# Patient Record
Sex: FEMALE | Race: WHITE | Employment: FULL TIME | URBAN - NONMETROPOLITAN AREA
[De-identification: names, ages, dates, MRNs, and addresses within clinical notes are randomized per-mention and may not be internally consistent; named-entity substitution may affect disease eponyms.]

---

## 2017-04-21 ENCOUNTER — OFFICE VISIT (OUTPATIENT)
Dept: GASTROENTEROLOGY | Age: 52
End: 2017-04-21
Payer: COMMERCIAL

## 2017-04-21 VITALS
HEART RATE: 54 BPM | OXYGEN SATURATION: 92 % | HEIGHT: 67 IN | BODY MASS INDEX: 33.9 KG/M2 | WEIGHT: 216 LBS | SYSTOLIC BLOOD PRESSURE: 136 MMHG | DIASTOLIC BLOOD PRESSURE: 82 MMHG

## 2017-04-21 DIAGNOSIS — Z12.11 ENCOUNTER FOR SCREENING COLONOSCOPY: Primary | ICD-10-CM

## 2017-04-21 PROCEDURE — 99204 OFFICE O/P NEW MOD 45 MIN: CPT | Performed by: NURSE PRACTITIONER

## 2017-04-21 RX ORDER — CHOLECALCIFEROL (VITAMIN D3) 1250 MCG
CAPSULE ORAL
COMMUNITY

## 2017-04-21 ASSESSMENT — ENCOUNTER SYMPTOMS
SHORTNESS OF BREATH: 0
DIARRHEA: 0
ABDOMINAL PAIN: 0
VOICE CHANGE: 0
NAUSEA: 0
COUGH: 0
BACK PAIN: 0
SORE THROAT: 0
VOMITING: 0
CHEST TIGHTNESS: 0
RECTAL PAIN: 0
CONSTIPATION: 0
ABDOMINAL DISTENTION: 0
BLOOD IN STOOL: 0

## 2017-05-25 ENCOUNTER — ANESTHESIA EVENT (OUTPATIENT)
Dept: ENDOSCOPY | Age: 52
End: 2017-05-25
Payer: COMMERCIAL

## 2017-05-25 ENCOUNTER — HOSPITAL ENCOUNTER (OUTPATIENT)
Age: 52
Setting detail: OUTPATIENT SURGERY
Discharge: HOME | End: 2017-05-25
Attending: INTERNAL MEDICINE | Admitting: INTERNAL MEDICINE
Payer: COMMERCIAL

## 2017-05-25 ENCOUNTER — ANESTHESIA (OUTPATIENT)
Dept: ENDOSCOPY | Age: 52
End: 2017-05-25
Payer: COMMERCIAL

## 2017-05-25 VITALS
HEIGHT: 67 IN | TEMPERATURE: 98 F | HEART RATE: 59 BPM | OXYGEN SATURATION: 100 % | SYSTOLIC BLOOD PRESSURE: 125 MMHG | WEIGHT: 208.4 LBS | BODY MASS INDEX: 32.71 KG/M2 | DIASTOLIC BLOOD PRESSURE: 90 MMHG | RESPIRATION RATE: 18 BRPM

## 2017-05-25 VITALS
RESPIRATION RATE: 14 BRPM | DIASTOLIC BLOOD PRESSURE: 59 MMHG | SYSTOLIC BLOOD PRESSURE: 114 MMHG | OXYGEN SATURATION: 98 %

## 2017-05-25 PROBLEM — Z12.11 SCREENING FOR COLON CANCER: Status: ACTIVE | Noted: 2017-05-25

## 2017-05-25 LAB — HCG(URINE) PREGNANCY TEST: NEGATIVE

## 2017-05-25 PROCEDURE — 3700000000 HC ANESTHESIA ATTENDED CARE: Performed by: INTERNAL MEDICINE

## 2017-05-25 PROCEDURE — 7100000010 HC PHASE II RECOVERY - FIRST 15 MIN: Performed by: INTERNAL MEDICINE

## 2017-05-25 PROCEDURE — 2500000003 HC RX 250 WO HCPCS: Performed by: INTERNAL MEDICINE

## 2017-05-25 PROCEDURE — 3700000001 HC ADD 15 MINUTES (ANESTHESIA): Performed by: INTERNAL MEDICINE

## 2017-05-25 PROCEDURE — 81025 URINE PREGNANCY TEST: CPT

## 2017-05-25 PROCEDURE — 2500000003 HC RX 250 WO HCPCS: Performed by: NURSE ANESTHETIST, CERTIFIED REGISTERED

## 2017-05-25 PROCEDURE — 6360000002 HC RX W HCPCS: Performed by: NURSE ANESTHETIST, CERTIFIED REGISTERED

## 2017-05-25 PROCEDURE — 45378 DIAGNOSTIC COLONOSCOPY: CPT | Performed by: INTERNAL MEDICINE

## 2017-05-25 PROCEDURE — 3609009500 HC COLONOSCOPY DIAGNOSTIC OR SCREENING: Performed by: INTERNAL MEDICINE

## 2017-05-25 PROCEDURE — 7100000011 HC PHASE II RECOVERY - ADDTL 15 MIN: Performed by: INTERNAL MEDICINE

## 2017-05-25 PROCEDURE — 2580000003 HC RX 258: Performed by: INTERNAL MEDICINE

## 2017-05-25 RX ORDER — LIDOCAINE HYDROCHLORIDE 10 MG/ML
1 INJECTION, SOLUTION EPIDURAL; INFILTRATION; INTRACAUDAL; PERINEURAL ONCE
Status: COMPLETED | OUTPATIENT
Start: 2017-05-25 | End: 2017-05-25

## 2017-05-25 RX ORDER — LIDOCAINE HYDROCHLORIDE 10 MG/ML
INJECTION, SOLUTION INFILTRATION; PERINEURAL PRN
Status: DISCONTINUED | OUTPATIENT
Start: 2017-05-25 | End: 2017-05-25 | Stop reason: SDUPTHER

## 2017-05-25 RX ORDER — DIPHENHYDRAMINE HYDROCHLORIDE 50 MG/ML
12.5 INJECTION INTRAMUSCULAR; INTRAVENOUS
Status: DISCONTINUED | OUTPATIENT
Start: 2017-05-25 | End: 2017-05-25 | Stop reason: HOSPADM

## 2017-05-25 RX ORDER — PROPOFOL 10 MG/ML
INJECTION, EMULSION INTRAVENOUS CONTINUOUS PRN
Status: DISCONTINUED | OUTPATIENT
Start: 2017-05-25 | End: 2017-05-25 | Stop reason: SDUPTHER

## 2017-05-25 RX ORDER — PROMETHAZINE HYDROCHLORIDE 25 MG/ML
6.25 INJECTION, SOLUTION INTRAMUSCULAR; INTRAVENOUS
Status: DISCONTINUED | OUTPATIENT
Start: 2017-05-25 | End: 2017-05-25 | Stop reason: HOSPADM

## 2017-05-25 RX ORDER — ONDANSETRON 2 MG/ML
4 INJECTION INTRAMUSCULAR; INTRAVENOUS
Status: DISCONTINUED | OUTPATIENT
Start: 2017-05-25 | End: 2017-05-25 | Stop reason: HOSPADM

## 2017-05-25 RX ORDER — PROPOFOL 10 MG/ML
INJECTION, EMULSION INTRAVENOUS PRN
Status: DISCONTINUED | OUTPATIENT
Start: 2017-05-25 | End: 2017-05-25 | Stop reason: SDUPTHER

## 2017-05-25 RX ORDER — SODIUM CHLORIDE, SODIUM LACTATE, POTASSIUM CHLORIDE, CALCIUM CHLORIDE 600; 310; 30; 20 MG/100ML; MG/100ML; MG/100ML; MG/100ML
INJECTION, SOLUTION INTRAVENOUS CONTINUOUS
Status: DISCONTINUED | OUTPATIENT
Start: 2017-05-25 | End: 2017-05-25 | Stop reason: HOSPADM

## 2017-05-25 RX ADMIN — PROPOFOL 2 MCG/KG/MIN: 10 INJECTION, EMULSION INTRAVENOUS at 09:33

## 2017-05-25 RX ADMIN — LIDOCAINE HYDROCHLORIDE 1 ML: 10 INJECTION, SOLUTION EPIDURAL; INFILTRATION; INTRACAUDAL; PERINEURAL at 08:48

## 2017-05-25 RX ADMIN — LIDOCAINE HYDROCHLORIDE 5 ML: 10 INJECTION, SOLUTION INFILTRATION; PERINEURAL at 09:33

## 2017-05-25 RX ADMIN — PROPOFOL 320 MG: 10 INJECTION, EMULSION INTRAVENOUS at 09:33

## 2017-05-25 RX ADMIN — SODIUM CHLORIDE, POTASSIUM CHLORIDE, SODIUM LACTATE AND CALCIUM CHLORIDE: 600; 310; 30; 20 INJECTION, SOLUTION INTRAVENOUS at 08:48

## 2017-05-25 ASSESSMENT — PAIN SCALES - GENERAL
PAINLEVEL_OUTOF10: 0
PAINLEVEL_OUTOF10: 0

## 2017-05-25 ASSESSMENT — PAIN - FUNCTIONAL ASSESSMENT: PAIN_FUNCTIONAL_ASSESSMENT: 0-10

## 2018-08-29 PROCEDURE — 88305 TISSUE EXAM BY PATHOLOGIST: CPT | Performed by: OPHTHALMOLOGY

## 2018-08-30 ENCOUNTER — LAB REQUISITION (OUTPATIENT)
Dept: LAB | Facility: HOSPITAL | Age: 53
End: 2018-08-30

## 2018-08-30 DIAGNOSIS — Z00.00 ENCOUNTER FOR GENERAL ADULT MEDICAL EXAMINATION WITHOUT ABNORMAL FINDINGS: ICD-10-CM

## 2018-08-31 LAB
CYTO UR: NORMAL
LAB AP CASE REPORT: NORMAL
LAB AP CLINICAL INFORMATION: NORMAL
LAB AP DIAGNOSIS COMMENT: NORMAL
PATH REPORT.FINAL DX SPEC: NORMAL
PATH REPORT.GROSS SPEC: NORMAL

## 2018-09-26 PROBLEM — Z12.11 SCREENING FOR COLON CANCER: Status: RESOLVED | Noted: 2017-05-25 | Resolved: 2018-09-26

## 2019-03-26 ENCOUNTER — OFFICE VISIT (OUTPATIENT)
Dept: OBGYN | Age: 54
End: 2019-03-26
Payer: COMMERCIAL

## 2019-03-26 VITALS
HEART RATE: 63 BPM | DIASTOLIC BLOOD PRESSURE: 75 MMHG | HEIGHT: 67 IN | SYSTOLIC BLOOD PRESSURE: 125 MMHG | WEIGHT: 236 LBS | BODY MASS INDEX: 37.04 KG/M2

## 2019-03-26 DIAGNOSIS — Z76.89 ENCOUNTER TO ESTABLISH CARE WITH NEW DOCTOR: Primary | ICD-10-CM

## 2019-03-26 DIAGNOSIS — Z12.4 SCREENING FOR MALIGNANT NEOPLASM OF CERVIX: ICD-10-CM

## 2019-03-26 DIAGNOSIS — Z01.419 ENCOUNTER FOR GYNECOLOGICAL EXAMINATION WITHOUT ABNORMAL FINDING: ICD-10-CM

## 2019-03-26 DIAGNOSIS — Z12.31 SCREENING MAMMOGRAM, ENCOUNTER FOR: ICD-10-CM

## 2019-03-26 PROCEDURE — 99386 PREV VISIT NEW AGE 40-64: CPT | Performed by: OBSTETRICS & GYNECOLOGY

## 2019-03-26 RX ORDER — LISINOPRIL AND HYDROCHLOROTHIAZIDE 12.5; 1 MG/1; MG/1
1 TABLET ORAL EVERY OTHER DAY
COMMUNITY

## 2019-03-26 RX ORDER — CELECOXIB 100 MG/1
100 CAPSULE ORAL 2 TIMES DAILY
COMMUNITY
End: 2020-08-04

## 2019-03-26 ASSESSMENT — ENCOUNTER SYMPTOMS
GASTROINTESTINAL NEGATIVE: 1
EYES NEGATIVE: 1
RESPIRATORY NEGATIVE: 1

## 2019-04-02 LAB
HPV TYPE 16: NOT DETECTED
HPV TYPE 18: NOT DETECTED
INTERPRETATION: NORMAL
OTHER HIGH RISK HPV: NOT DETECTED
SOURCE: NORMAL

## 2019-04-19 ENCOUNTER — HOSPITAL ENCOUNTER (OUTPATIENT)
Dept: WOMENS IMAGING | Age: 54
Discharge: HOME | End: 2019-04-19
Payer: COMMERCIAL

## 2019-04-19 DIAGNOSIS — Z12.31 SCREENING MAMMOGRAM, ENCOUNTER FOR: ICD-10-CM

## 2019-04-19 PROCEDURE — 77063 BREAST TOMOSYNTHESIS BI: CPT

## 2019-06-06 ENCOUNTER — TELEPHONE (OUTPATIENT)
Dept: VASCULAR SURGERY | Facility: CLINIC | Age: 54
End: 2019-06-06

## 2019-06-18 ENCOUNTER — TELEPHONE (OUTPATIENT)
Dept: VASCULAR SURGERY | Facility: CLINIC | Age: 54
End: 2019-06-18

## 2019-07-18 ENCOUNTER — OFFICE VISIT (OUTPATIENT)
Dept: VASCULAR SURGERY | Facility: CLINIC | Age: 54
End: 2019-07-18

## 2019-07-18 VITALS
SYSTOLIC BLOOD PRESSURE: 118 MMHG | DIASTOLIC BLOOD PRESSURE: 76 MMHG | HEART RATE: 82 BPM | WEIGHT: 226 LBS | BODY MASS INDEX: 34.25 KG/M2 | HEIGHT: 68 IN | OXYGEN SATURATION: 97 %

## 2019-07-18 DIAGNOSIS — I10 ESSENTIAL HYPERTENSION: ICD-10-CM

## 2019-07-18 DIAGNOSIS — I83.893 VARICOSE VEINS OF BOTH LEGS WITH EDEMA: Primary | ICD-10-CM

## 2019-07-18 PROCEDURE — 99204 OFFICE O/P NEW MOD 45 MIN: CPT | Performed by: SURGERY

## 2019-07-18 RX ORDER — AMITRIPTYLINE HYDROCHLORIDE 25 MG/1
TABLET, FILM COATED ORAL
COMMUNITY
Start: 2019-06-03 | End: 2019-10-22

## 2019-07-18 RX ORDER — LISINOPRIL AND HYDROCHLOROTHIAZIDE 12.5; 1 MG/1; MG/1
TABLET ORAL
COMMUNITY
Start: 2019-01-28

## 2019-07-18 RX ORDER — CHLORAL HYDRATE 500 MG
CAPSULE ORAL
COMMUNITY

## 2019-07-18 RX ORDER — MEDROXYPROGESTERONE ACETATE 2.5 MG/1
TABLET ORAL
COMMUNITY
Start: 2019-06-21

## 2019-07-18 RX ORDER — CELECOXIB 200 MG/1
CAPSULE ORAL
COMMUNITY
Start: 2019-06-27 | End: 2019-10-22

## 2019-07-18 RX ORDER — CONJUGATED ESTROGENS 0.3 MG/1
TABLET, FILM COATED ORAL
COMMUNITY
Start: 2019-06-21

## 2019-07-18 NOTE — PROGRESS NOTES
2019      Dhiraj Perez MD  [address] [facility] [place] [address]    Macrina Jay  1965    Chief Complaint   Patient presents with   • Establish Care     referral from Dr. Perez for bilateral painful varicose veins in legs  - denies swelling , predominantly in the left leg.        Dear Dhiraj Perez MD    HPI  I had the pleasure of seeing your patient Macrina Jay in the office today.  Thank you kindly for this consultation.  As you recall, Macrina Jay is a 54 y.o.  female who you are currently following for routine health maintenance.  She states she has pain to her left leg, worse at night.  She states she will be needing right knee replacement in the future.  She does have varicose veins to bilateral lower extremities.        Past Medical History:   Diagnosis Date   • Hypertension        Past Surgical History:   Procedure Laterality Date   •  SECTION     • FOOT SURGERY     • TUBAL ABDOMINAL LIGATION         Family History   Problem Relation Age of Onset   • No Known Problems Mother    • No Known Problems Father        Social History     Socioeconomic History   • Marital status:      Spouse name: Not on file   • Number of children: Not on file   • Years of education: Not on file   • Highest education level: Not on file   Tobacco Use   • Smoking status: Never Smoker   • Smokeless tobacco: Never Used       No Known Allergies      Current Outpatient Medications:   •  amitriptyline (ELAVIL) 25 MG tablet, , Disp: , Rfl:   •  celecoxib (CeleBREX) 200 MG capsule, , Disp: , Rfl:   •  Cholecalciferol (VITAMIN D3 PO), Take 500 mg by mouth., Disp: , Rfl:   •  lisinopril-hydrochlorothiazide (PRINZIDE,ZESTORETIC) 10-12.5 MG per tablet, , Disp: , Rfl:   •  medroxyPROGESTERone (PROVERA) 2.5 MG tablet, , Disp: , Rfl:   •  NON FORMULARY, Spiralina vitamin, Disp: , Rfl:   •  Omega-3 1000 MG capsule, Take  by mouth., Disp: , Rfl:   •  PREMARIN 0.3 MG tablet, , Disp: , Rfl:   •   "Probiotic Product (PROBIOTIC-10) capsule, Take  by mouth., Disp: , Rfl:      Review of Systems   Constitutional: Negative.    HENT: Negative.    Eyes: Negative.    Respiratory: Negative.    Cardiovascular: Positive for leg swelling.        Occasional cramping   Gastrointestinal: Negative.    Endocrine: Negative.    Genitourinary: Negative.    Musculoskeletal: Negative.    Skin: Negative.    Allergic/Immunologic: Negative.    Neurological: Negative.    Hematological: Negative.    Psychiatric/Behavioral: Negative.      /76 (BP Location: Left arm, Patient Position: Sitting, Cuff Size: Adult)   Pulse 82   Ht 172.7 cm (68\")   Wt 103 kg (226 lb)   SpO2 97%   BMI 34.36 kg/m²     Physical Exam   Constitutional: She is oriented to person, place, and time. She appears well-developed and well-nourished.   HENT:   Head: Normocephalic and atraumatic.   Eyes: Pupils are equal, round, and reactive to light. No scleral icterus.   Neck: Neck supple. No JVD present. Carotid bruit is not present. No thyromegaly present.   Cardiovascular: Normal rate, regular rhythm and normal heart sounds.   Pulses:       Carotid pulses are 2+ on the right side, and 2+ on the left side.       Femoral pulses are 2+ on the right side, and 2+ on the left side.       Popliteal pulses are 2+ on the right side, and 2+ on the left side.        Dorsalis pedis pulses are 2+ on the right side, and 2+ on the left side.        Posterior tibial pulses are 2+ on the right side, and 2+ on the left side.   Varicose veins to bilateral lower extremities   Pulmonary/Chest: Effort normal and breath sounds normal.   Abdominal: Soft. Bowel sounds are normal. She exhibits no distension, no abdominal bruit and no mass. There is no hepatosplenomegaly. There is no tenderness.   Musculoskeletal: Normal range of motion. She exhibits edema.   Lymphadenopathy:     She has no cervical adenopathy.   Neurological: She is alert and oriented to person, place, and time. She " has normal strength. No cranial nerve deficit or sensory deficit.   Skin: Skin is warm, dry and intact.   Psychiatric: She has a normal mood and affect.   Nursing note and vitals reviewed.      Patient Active Problem List   Diagnosis   • Hypertension        Diagnosis Plan   1. Varicose veins of both legs with edema  US Venous Doppler Lower Extremity Bilateral (duplex)   2. Essential hypertension         Plan: After thoroughly evaluating Macrina Jay, I believe the best course of action is to initially remain conservative from a vascular standpoint.  I will give the patient a prescription for compression stockings in the 20-30 mm pressure gradient range.  I did instruct the patient on how to wear these on a daily basis.  I would like her to keeps her legs elevated when she is not on them, and keep her legs well moisturized.  We will see the patient back in 3 months with a venous valvular insufficiency study. If she does have significant venous insufficiency, she may be a great candidate for endovenous closure. Her blood pressure is well controlled on her current medication regimen. The patient is to continue taking their medications as previously discussed.   This was all discussed in full with complete understanding.  Thank you for allowing me to participate in the care of your patient.  Please do not hesitate to call with any questions or concerns.  We will keep you aware of any further encounters with Macrina Jay.        Sincerely yours,         DO Chris Calderon James Noah, MD

## 2019-10-21 ENCOUNTER — TELEPHONE (OUTPATIENT)
Dept: VASCULAR SURGERY | Facility: CLINIC | Age: 54
End: 2019-10-21

## 2019-10-21 NOTE — TELEPHONE ENCOUNTER
Left message reminding Mrs Jay of her appointments for Tuesday, October 22nd, 2019. Reminded Mrs Jay to arrive at the [facility] at 830 am for testing and follow up afterwards at 1145 am with Dr Esposito. Also advised if she had any questions or needed to reschedule to please call the office at [phone].

## 2019-10-22 ENCOUNTER — HOSPITAL ENCOUNTER (OUTPATIENT)
Dept: ULTRASOUND IMAGING | Facility: HOSPITAL | Age: 54
Discharge: HOME | End: 2019-10-22
Admitting: SURGERY

## 2019-10-22 ENCOUNTER — OFFICE VISIT (OUTPATIENT)
Dept: VASCULAR SURGERY | Facility: CLINIC | Age: 54
End: 2019-10-22

## 2019-10-22 VITALS
SYSTOLIC BLOOD PRESSURE: 134 MMHG | BODY MASS INDEX: 33.19 KG/M2 | OXYGEN SATURATION: 97 % | HEIGHT: 68 IN | HEART RATE: 61 BPM | DIASTOLIC BLOOD PRESSURE: 84 MMHG | WEIGHT: 219 LBS

## 2019-10-22 DIAGNOSIS — I83.893 VARICOSE VEINS OF BOTH LEGS WITH EDEMA: ICD-10-CM

## 2019-10-22 DIAGNOSIS — I10 ESSENTIAL HYPERTENSION: ICD-10-CM

## 2019-10-22 DIAGNOSIS — I87.2 VENOUS INSUFFICIENCY: Primary | ICD-10-CM

## 2019-10-22 PROCEDURE — 93970 EXTREMITY STUDY: CPT

## 2019-10-22 PROCEDURE — 93970 EXTREMITY STUDY: CPT | Performed by: SURGERY

## 2019-10-22 PROCEDURE — 99214 OFFICE O/P EST MOD 30 MIN: CPT | Performed by: SURGERY

## 2019-10-22 NOTE — PROGRESS NOTES
"10/22/2019       Dhiraj Perez MD  [address] [facility] [place] [address]    Macrina Jay  1965    Chief Complaint   Patient presents with   • Follow-up     3 Month Follow UP FOr Varicose Veins of both Legs with Edema. Test 103983 US pad venous lower ext bilat. Patient denies any stroke like symptoms.        Dear Dhiraj Perez MD   HPI  I had the pleasure of seeing your patient Macrina Jay in the office today.  As you recall, Macrina Jay is a 54 y.o.  female who you are currently following for routine health maintenance.  She states she has pain to her left leg, worse at night.  She states she will be needing right knee replacement in the future.  She does have varicose veins to bilateral lower extremities.  She has been wearing compression stockings without significant relief.  She did have noninvasive testing performed today, which I did review in office.       Review of Systems   Constitutional: Negative.    HENT: Negative.    Eyes: Negative.    Respiratory: Negative.    Cardiovascular: Positive for leg swelling.        Occasional cramping   Gastrointestinal: Negative.    Endocrine: Negative.    Genitourinary: Negative.    Musculoskeletal: Negative.    Skin: Negative.    Allergic/Immunologic: Negative.    Neurological: Negative.    Hematological: Negative.    Psychiatric/Behavioral: Negative.      /84   Pulse 61   Ht 172.7 cm (68\")   Wt 99.3 kg (219 lb)   SpO2 97%   BMI 33.30 kg/m²     Physical Exam   Constitutional: She is oriented to person, place, and time. She appears well-developed and well-nourished.   HENT:   Head: Normocephalic and atraumatic.   Eyes: Pupils are equal, round, and reactive to light. No scleral icterus.   Neck: Normal range of motion. Neck supple. No JVD present. Carotid bruit is not present. No thyromegaly present.   Cardiovascular: Normal rate, regular rhythm and normal heart sounds.   Pulses:       Carotid pulses are 2+ on the right side, and 2+ " on the left side.       Femoral pulses are 2+ on the right side, and 2+ on the left side.       Popliteal pulses are 2+ on the right side, and 2+ on the left side.        Dorsalis pedis pulses are 2+ on the right side, and 2+ on the left side.        Posterior tibial pulses are 2+ on the right side, and 2+ on the left side.   Varicose veins to bilateral lower extremities   Pulmonary/Chest: Effort normal and breath sounds normal.   Abdominal: Soft. Bowel sounds are normal. She exhibits no distension, no abdominal bruit and no mass. There is no hepatosplenomegaly. There is no tenderness.   Musculoskeletal: Normal range of motion. She exhibits edema.   Lymphadenopathy:     She has no cervical adenopathy.   Neurological: She is alert and oriented to person, place, and time. She has normal strength. No cranial nerve deficit or sensory deficit.   Skin: Skin is warm, dry and intact.   Psychiatric: She has a normal mood and affect. Her behavior is normal. Judgment and thought content normal.   Nursing note and vitals reviewed.      Patient Active Problem List   Diagnosis   • Hypertension        Diagnosis Plan   1. Venous insufficiency     2. Essential hypertension         Plan: After thoroughly evaluating Macrina Jay, I believe the best course of action is to remain conservative from a vascular standpoint.  She does qualify for endovenous closure, however would like to continue with stockings at this time.  I would like her to continue wearing compression stockings in the 20-30 mm pressure gradient range.  I did instruct the patient on how to wear these on a daily basis.  I would like her to keeps her legs elevated when she is not on them, and keep her legs well moisturized. We will see her back in 1 year for continued surveillance.   Her blood pressure is well controlled on her current medication regimen. The patient is to continue taking their medications as previously discussed.   This was all discussed in full with  complete understanding.  Thank you for allowing me to participate in the care of your patient.  Please do not hesitate to call with any questions or concerns.  We will keep you aware of any further encounters with Macrina Jay.        Sincerely yours,         MERLY Villa James Noah, MD

## 2020-01-20 ENCOUNTER — TELEPHONE (OUTPATIENT)
Dept: OBGYN | Age: 55
End: 2020-01-20

## 2020-01-20 NOTE — TELEPHONE ENCOUNTER
Semaj Leon requests that office return their call. The best time to reach her is Anytime. Patient asking to Email sent of her appointment and her Mammogram appointment . Patient work is requesting the imformation. Thank you.

## 2020-08-04 ENCOUNTER — OFFICE VISIT (OUTPATIENT)
Dept: OBGYN | Age: 55
End: 2020-08-04
Payer: COMMERCIAL

## 2020-08-04 ENCOUNTER — HOSPITAL ENCOUNTER (OUTPATIENT)
Dept: WOMENS IMAGING | Age: 55
Discharge: HOME | End: 2020-08-04
Payer: COMMERCIAL

## 2020-08-04 VITALS
WEIGHT: 221 LBS | BODY MASS INDEX: 33.49 KG/M2 | TEMPERATURE: 98.4 F | HEIGHT: 68 IN | SYSTOLIC BLOOD PRESSURE: 132 MMHG | DIASTOLIC BLOOD PRESSURE: 78 MMHG | HEART RATE: 88 BPM

## 2020-08-04 PROCEDURE — 99396 PREV VISIT EST AGE 40-64: CPT | Performed by: OBSTETRICS & GYNECOLOGY

## 2020-08-04 PROCEDURE — 77067 SCR MAMMO BI INCL CAD: CPT

## 2020-08-04 RX ORDER — MEDROXYPROGESTERONE ACETATE 2.5 MG/1
2.5 TABLET ORAL DAILY
Qty: 90 TABLET | Refills: 3 | Status: SHIPPED | OUTPATIENT
Start: 2020-08-04 | End: 2021-08-16 | Stop reason: SDUPTHER

## 2020-08-04 ASSESSMENT — ENCOUNTER SYMPTOMS
EYES NEGATIVE: 1
RESPIRATORY NEGATIVE: 1
GASTROINTESTINAL NEGATIVE: 1

## 2020-08-04 NOTE — LETTER
[facility]  [address]  [address]  Phone: [phone]  Fax: [address] [place] [address] Bobo Crespo MD        August 7, 2020    [address] [place] [address]      Dear Dale York: The results of your most recent Pap smear are normal. This means that no cancerous or precancerous cells were seen. We recommend that you come back in 1 year for your next routine Pap smear. If you have any questions or concerns, please don't hesitate to call.     Sincerely,          Maribel Coyle MD

## 2020-08-04 NOTE — PATIENT INSTRUCTIONS
checked during a routine doctor visit. Your doctor will tell you how often to check your blood pressure based on your age, your blood pressure results, and other factors. · Mammogram. Ask your doctor how often you should have a mammogram, which is an X-ray of your breasts. A mammogram can spot breast cancer before it can be felt and when it is easiest to treat. · Pap test and pelvic exam. Ask your doctor how often you should have a Pap test. You may not need to have a Pap test as often as you used to. · Vision. Have your eyes checked every year or two or as often as your doctor suggests. Some experts recommend that you have yearly exams for glaucoma and other age-related eye problems starting at age 48. · Hearing. Tell your doctor if you notice any change in your hearing. You can have tests to find out how well you hear. · Diabetes. Ask your doctor whether you should have tests for diabetes. · Colorectal cancer. Your risk for colorectal cancer gets higher as you get older. Some experts say that adults should start regular screening at age 48 and stop at age 76. Others say to start before age 48 or continue after age 76. Talk with your doctor about your risk and when to start and stop screening. · Thyroid disease. Talk to your doctor about whether to have your thyroid checked as part of a regular physical exam. Women have an increased chance of a thyroid problem. · Osteoporosis. You should begin tests for bone density at age 72. If you are younger than 72, ask your doctor whether you have factors that may increase your risk for this disease. You may want to have this test before age 72. · Heart attack and stroke risk. At least every 4 to 6 years, you should have your risk for heart attack and stroke assessed. Your doctor uses factors such as your age, blood pressure, cholesterol, and whether you smoke or have diabetes to show what your risk for a heart attack or stroke is over the next 10 years.   When should you call for help? Watch closely for changes in your health, and be sure to contact your doctor if you have any problems or symptoms that concern you. Where can you learn more? Go to [email or web address] and sign in to your PayPal account. Enter Y680 in the LilyMedia box to learn more about \"Well Visit, Women 50 to 72: Care Instructions. \"     If you do not have an account, please click on the \"Sign Up Now\" link. Current as of: August 22, 2019               Content Version: 12.5  © 2319-8457 Healthwise, Incorporated. Care instructions adapted under license by [facility] ([facility]). If you have questions about a medical condition or this instruction, always ask your healthcare professional. [address] any warranty or liability for your use of this information.

## 2020-08-04 NOTE — PROGRESS NOTES
I, Brianne Vásquez RN, am scribing for and in the presence of Dr. Keaton Arvizu 2020/2:38 PM/sign    Subjective:      Patient ID: Shashi Stover is a 60 y.o. female. VERA Jara is here for her annual exam. She is c/o a bump her right inner thigh area that has been present for several years. Area is not painful. She had  Her mammogram done today. She is doing well on progesterone and estrogen for hot flashes, needing refills. Shashi Stover is a 60 y.o. female with the following history as recorded in [facility]: There are no active problems to display for this patient. Current Outpatient Medications   Medication Sig Dispense Refill    estrogens, conjugated, (PREMARIN) 0.3 MG tablet Take 1 tablet by mouth daily 90 tablet 3    medroxyPROGESTERone (PROVERA) 2.5 MG tablet Take 1 tablet by mouth daily 90 tablet 3    lisinopril-hydrochlorothiazide (PRINZIDE;ZESTORETIC) 10-12.5 MG per tablet Take 1 tablet by mouth daily      estrogen, conjugated,-medroxyprogesterone (PREMPRO) 0.3-1.5 MG per tablet Take 1 tablet by mouth daily 30 tablet 11    Multiple Vitamins-Minerals (MULTIVITAMIN ADULT PO) Take by mouth      Cholecalciferol (VITAMIN D3) 11308 UNITS CAPS Take by mouth      Omega-3 Fatty Acids (OMEGA 3 PO) Take by mouth      Probiotic Product (PROBIOTIC DAILY PO) Take by mouth       No current facility-administered medications for this visit. Allergies: Patient has no known allergies. History reviewed. No pertinent past medical history.   Past Surgical History:   Procedure Laterality Date     SECTION  1991    x1    FOOT SURGERY Left     HI COLONOSCOPY FLX DX W/COLLJ SPEC WHEN PFRMD N/A 2017    Dr Rigoberto Elizalde, 10 yr recall    TUBAL LIGATION       Family History   Problem Relation Age of Onset    Heart Disease Maternal Uncle     Colon Cancer Maternal Grandfather     Dementia Mother     Colon Polyps Neg Hx     Liver Cancer Neg Hx     Liver Disease Neg Hx  Esophageal Cancer Neg Hx     Rectal Cancer Neg Hx     Stomach Cancer Neg Hx      Social History     Tobacco Use    Smoking status: Never Smoker    Smokeless tobacco: Never Used   Substance Use Topics    Alcohol use: Yes     Comment: occ       Review of Systems   Constitutional: Negative. HENT: Negative. Eyes: Negative. Respiratory: Negative. Cardiovascular: Negative. Gastrointestinal: Negative. Genitourinary: Negative for difficulty urinating, dyspareunia, dysuria, enuresis, frequency, hematuria, menstrual problem, pelvic pain, urgency and vaginal discharge. Musculoskeletal: Negative. Skin: Negative. Neurological: Negative. Psychiatric/Behavioral: Negative. Objective:/78 (Site: Left Upper Arm, Position: Sitting, Cuff Size: Large Adult)   Pulse 88   Temp 98.4 °F (36.9 °C) (Temporal)   Ht 5' 8\" (1.727 m)   Wt 221 lb (100.2 kg)   LMP 08/25/2016 (Approximate)   BMI 33.60 kg/m²      Physical Exam  Constitutional:       General: She is not in acute distress. Appearance: She is well-developed. HENT:      Head: Normocephalic and atraumatic. Right Ear: Hearing normal.      Left Ear: Hearing normal.      Nose: Nose normal.   Eyes:      General: Lids are normal.      Conjunctiva/sclera: Conjunctivae normal.      Pupils: Pupils are equal, round, and reactive to light. Neck:      Musculoskeletal: Normal range of motion and neck supple. Thyroid: No thyromegaly. Trachea: No tracheal deviation. Cardiovascular:      Rate and Rhythm: Normal rate and regular rhythm. Heart sounds: Normal heart sounds. Pulmonary:      Effort: Pulmonary effort is normal. No respiratory distress. Breath sounds: Normal breath sounds. No wheezing. Chest:      Breasts: Breasts are symmetrical.         Right: No inverted nipple, mass, nipple discharge, skin change or tenderness. Left: No inverted nipple, mass, nipple discharge, skin change or tenderness. Abdominal:      General: There is no distension. Palpations: Abdomen is soft. Tenderness: There is no abdominal tenderness. There is no guarding or rebound. Genitourinary:     Labia:         Right: No rash, tenderness or lesion. Left: No rash, tenderness or lesion. Vagina: No vaginal discharge or bleeding. Cervix: No cervical motion tenderness, discharge or friability. Uterus: Not deviated, not enlarged, not fixed and not tender. Adnexa:         Right: No mass, tenderness or fullness. Left: No mass, tenderness or fullness. Rectum: No anal fissure or external hemorrhoid. Comments: Specimen for cervical cytology obtained. Musculoskeletal: Normal range of motion. Comments: Normal ROM in all four extremities; normal gait   Lymphadenopathy:      Head:      Right side of head: No submental, submandibular or tonsillar adenopathy. Left side of head: No submental, submandibular or tonsillar adenopathy. Cervical: No cervical adenopathy. Upper Body:      Right upper body: No supraclavicular adenopathy. Left upper body: No supraclavicular adenopathy. Skin:     General: Skin is warm and dry. Findings: No erythema or rash. Comments: Lipoma noted to inner right thigh   Neurological:      Mental Status: She is alert and oriented to person, place, and time. Psychiatric:         Behavior: Behavior normal.         Assessment:       Diagnosis Orders   1. Well woman exam with routine gynecological exam  PAP SMEAR   2. Screening for cervical cancer  PAP SMEAR   3. Screening for HPV (human papillomavirus)  Human papillomavirus (HPV) DNA Probe Thin Prep High Risk   4.  Lipoma of right lower extremity  Sabrina Donovan MD, General Surgery, [place]           Plan:      MEDICATIONS:  Orders Placed This Encounter   Medications    estrogens, conjugated, (PREMARIN) 0.3 MG tablet     Sig: Take 1 tablet by mouth daily     Dispense:  90 tablet     Refill:  3    medroxyPROGESTERone (PROVERA) 2.5 MG tablet     Sig: Take 1 tablet by mouth daily     Dispense:  90 tablet     Refill:  3       ORDERS:  Orders Placed This Encounter   Procedures    PAP SMEAR    Human papillomavirus (HPV) DNA Probe Thin Prep High Risk    Jenise Rico MD, General Surgery, [place]     Pap obtained  Reviewed normal mammogram results with pt  Will refer to general surgery regarding lipoma  HRT refilled. All questions answered. Bianka Villanueva MD, personally performed services described in this document as scribed by Lizandro Root RN in my presence, and it is both accurate and complete.

## 2020-08-06 ENCOUNTER — TELEPHONE (OUTPATIENT)
Dept: OBGYN | Age: 55
End: 2020-08-06

## 2020-08-06 NOTE — TELEPHONE ENCOUNTER
Pt called and states she forgot to talk to Dr Miguel Angel Corcoran about having a breast reduction when she was here for her last reece. She states she has neck and back pain. She would like a call after 1 tomorrow 8/7/2020.  Merry/MATT

## 2020-08-07 LAB
HPV COMMENT: NORMAL
HPV TYPE 16: NOT DETECTED
HPV TYPE 18: NOT DETECTED
HPVOH (OTHER TYPES): NOT DETECTED

## 2020-08-07 NOTE — TELEPHONE ENCOUNTER
L/m informing pt that she can call Dr. Deisy Mcmahan in [place] to schedule a consult. Phone number given and address.

## 2020-08-11 ENCOUNTER — OFFICE VISIT (OUTPATIENT)
Dept: SURGERY | Age: 55
End: 2020-08-11
Payer: COMMERCIAL

## 2020-08-11 VITALS
SYSTOLIC BLOOD PRESSURE: 136 MMHG | BODY MASS INDEX: 34.56 KG/M2 | WEIGHT: 228 LBS | DIASTOLIC BLOOD PRESSURE: 74 MMHG | HEIGHT: 68 IN | TEMPERATURE: 98 F

## 2020-08-11 PROCEDURE — 99202 OFFICE O/P NEW SF 15 MIN: CPT | Performed by: SURGERY

## 2020-08-11 RX ORDER — MELOXICAM 15 MG/1
TABLET ORAL
COMMUNITY
Start: 2020-08-03

## 2020-08-11 NOTE — LETTER
Preop Orders:     Patient: Vazquez Skinner  : 1965    Hospital: [facility]    Admitting Physician:  Dr. Alexander Warren    Diagnosis: right thigh lipoma    Procedure: excision right thigh lipoma    Time: 1 hour    Anesthesia: MAC    Admission: Outpatient     Date: to be scheduled    Labs: per anesthesia     Special Instructions:  none    Cardiac Clearance:  none    Special Equipment:  none    Pre-Op Meds:  none    Latex Allergy: no    Beta Blocker: no    Medication Instructions: none    Post op visit: 2 weeks      Electronically signed by Katelyn Kinney MD   On 20   @ 3:04 PM

## 2020-08-31 ASSESSMENT — ENCOUNTER SYMPTOMS
SHORTNESS OF BREATH: 0
BACK PAIN: 0
EYE PAIN: 0
RHINORRHEA: 0
ABDOMINAL DISTENTION: 0
CHOKING: 0
ABDOMINAL PAIN: 0
EYE REDNESS: 0

## 2020-08-31 NOTE — PROGRESS NOTES
Subjective:      Patient ID: Vazquez Skinner is a 60 y.o. female. HPI     Ms. Cathy Maradiaga is a 60year old female who presents with a complaint of a lump on her right thigh. She reports that this has been there for about 4-5 years. She reports that it has gotten a little larger, and is more prominent due to recent weight loss. She denies any pain, but again, has gotten larger. She denies any skin changes and no drainage. Past Medical History:   Diagnosis Date    Hypertension      Past Surgical History:   Procedure Laterality Date     SECTION  1991    x1    FOOT SURGERY Left 2016    CT COLONOSCOPY FLX DX W/COLLJ SPEC WHEN PFRMD N/A 2017    Dr Luda Waters, 10 yr recall   [facility]     Current Outpatient Medications on File Prior to Visit   Medication Sig Dispense Refill    estrogens, conjugated, (PREMARIN) 0.3 MG tablet Take 1 tablet by mouth daily 90 tablet 3    medroxyPROGESTERone (PROVERA) 2.5 MG tablet Take 1 tablet by mouth daily 90 tablet 3    lisinopril-hydrochlorothiazide (PRINZIDE;ZESTORETIC) 10-12.5 MG per tablet Take 1 tablet by mouth daily      estrogen, conjugated,-medroxyprogesterone (PREMPRO) 0.3-1.5 MG per tablet Take 1 tablet by mouth daily 30 tablet 11    Multiple Vitamins-Minerals (MULTIVITAMIN ADULT PO) Take by mouth      Cholecalciferol (VITAMIN D3) 62863 UNITS CAPS Take by mouth      Omega-3 Fatty Acids (OMEGA 3 PO) Take by mouth      meloxicam (MOBIC) 15 MG tablet       Probiotic Product (PROBIOTIC DAILY PO) Take by mouth       No current facility-administered medications on file prior to visit. Allergies: Patient has no known allergies.     Family History   Problem Relation Age of Onset    Heart Disease Maternal Uncle     Colon Cancer Maternal Grandfather     Dementia Mother     Colon Polyps Neg Hx     Liver Cancer Neg Hx     Liver Disease Neg Hx     Esophageal Cancer Neg Hx     Rectal Cancer Neg Hx     Stomach Cancer Neg Hx Social History     Tobacco Use    Smoking status: Never Smoker    Smokeless tobacco: Never Used   Substance Use Topics    Alcohol use: Yes     Comment: occ         Review of Systems   Constitutional: Negative for activity change and appetite change. HENT: Negative for congestion and rhinorrhea. Eyes: Negative for pain and redness. Respiratory: Negative for choking and shortness of breath. Gastrointestinal: Negative for abdominal distention and abdominal pain. Endocrine: Negative for polydipsia and polyphagia. Genitourinary: Negative for dysuria and hematuria. Musculoskeletal: Positive for arthralgias. Negative for back pain. Skin: Negative for rash and wound. Neurological: Negative for dizziness and headaches. Psychiatric/Behavioral: Negative for confusion and dysphoric mood. Objective:   Physical Exam  Vitals signs reviewed. Constitutional:       General: She is not in acute distress. Appearance: Normal appearance. HENT:      Head: Normocephalic and atraumatic. Nose: Nose normal.      Mouth/Throat:      Mouth: Mucous membranes are moist.   Eyes:      Extraocular Movements: Extraocular movements intact. Pupils: Pupils are equal, round, and reactive to light. Neck:      Musculoskeletal: Neck supple. Cardiovascular:      Rate and Rhythm: Normal rate and regular rhythm. Pulmonary:      Effort: Pulmonary effort is normal. No respiratory distress. Abdominal:      General: There is no distension. Palpations: Abdomen is soft. Musculoskeletal:         General: No swelling or deformity. Legs:       Comments: Approximately 6 cm mass, consistent with lipoma   Lymphadenopathy:      Cervical: No cervical adenopathy. Skin:     General: Skin is warm and dry. Neurological:      General: No focal deficit present. Mental Status: She is alert. Mental status is at baseline.    Psychiatric:         Mood and Affect: Mood normal.         Behavior: Behavior normal.         Assessment:      60year old female with right thigh lipoma      Plan:      The risks and benefits of lipoma excision were discussed with the patient, including but not limited to, bleeding and infection. The patient expressed understanding and would like to proceed with surgery.         Ananda Land MD

## 2020-09-01 ENCOUNTER — OFFICE VISIT (OUTPATIENT)
Dept: SURGERY | Age: 55
End: 2020-09-01
Payer: COMMERCIAL

## 2020-09-01 VITALS
WEIGHT: 228 LBS | HEIGHT: 68 IN | TEMPERATURE: 97.7 F | SYSTOLIC BLOOD PRESSURE: 136 MMHG | BODY MASS INDEX: 34.56 KG/M2 | DIASTOLIC BLOOD PRESSURE: 84 MMHG

## 2020-09-01 PROCEDURE — 99211 OFF/OP EST MAY X REQ PHY/QHP: CPT | Performed by: SURGERY

## 2020-09-01 NOTE — PROGRESS NOTES
Grandfather      Dementia Mother      Colon Polyps Neg Hx      Liver Cancer Neg Hx      Liver Disease Neg Hx      Esophageal Cancer Neg Hx      Rectal Cancer Neg Hx      Stomach Cancer Neg Hx             Social History            Tobacco Use    Smoking status: Never Smoker    Smokeless tobacco: Never Used   Substance Use Topics    Alcohol use: Yes       Comment: occ           Review of Systems   Constitutional: Negative for activity change and appetite change. HENT: Negative for congestion and rhinorrhea. Eyes: Negative for pain and redness. Respiratory: Negative for choking and shortness of breath. Gastrointestinal: Negative for abdominal distention and abdominal pain. Endocrine: Negative for polydipsia and polyphagia. Genitourinary: Negative for dysuria and hematuria. Musculoskeletal: Positive for arthralgias. Negative for back pain. Skin: Negative for rash and wound. Neurological: Negative for dizziness and headaches. Psychiatric/Behavioral: Negative for confusion and dysphoric mood.         Objective:   Physical Exam  Vitals signs reviewed. Constitutional:       General: She is not in acute distress. Appearance: Normal appearance. HENT:      Head: Normocephalic and atraumatic. Nose: Nose normal.      Mouth/Throat:      Mouth: Mucous membranes are moist.   Eyes:      Extraocular Movements: Extraocular movements intact. Pupils: Pupils are equal, round, and reactive to light. Neck:      Musculoskeletal: Neck supple. Cardiovascular:      Rate and Rhythm: Normal rate and regular rhythm. Pulmonary:      Effort: Pulmonary effort is normal. No respiratory distress. Abdominal:      General: There is no distension. Palpations: Abdomen is soft. Musculoskeletal:         General: No swelling or deformity. Legs:       Comments: Approximately 6 cm mass, consistent with lipoma   Lymphadenopathy:      Cervical: No cervical adenopathy.    Skin:     General: Skin is warm and dry. Neurological:      General: No focal deficit present. Mental Status: She is alert. Mental status is at baseline. Psychiatric:         Mood and Affect: Mood normal.         Behavior: Behavior normal.            Assessment:      60year old female with right thigh lipoma                Plan:      The risks and benefits of lipoma excision were discussed with the patient, including but not limited to, bleeding and infection.  The patient expressed understanding and would like to proceed with surgery.                   Dagoberto Khoury MD

## 2020-09-14 ENCOUNTER — OFFICE VISIT (OUTPATIENT)
Age: 55
End: 2020-09-14

## 2020-09-14 ENCOUNTER — HOSPITAL ENCOUNTER (OUTPATIENT)
Dept: NON INVASIVE DIAGNOSTICS | Age: 55
Discharge: HOME | End: 2020-09-14
Payer: COMMERCIAL

## 2020-09-14 VITALS — OXYGEN SATURATION: 96 % | TEMPERATURE: 97.6 F | HEART RATE: 59 BPM

## 2020-09-14 DIAGNOSIS — Z01.818 PREOPERATIVE TESTING: ICD-10-CM

## 2020-09-14 LAB
ANION GAP SERPL CALCULATED.3IONS-SCNC: 11 MMOL/L (ref 7–19)
BASOPHILS ABSOLUTE: 0 K/UL (ref 0–0.2)
BASOPHILS RELATIVE PERCENT: 0.5 % (ref 0–1)
BUN BLDV-MCNC: 17 MG/DL (ref 6–20)
CALCIUM SERPL-MCNC: 9.6 MG/DL (ref 8.6–10)
CHLORIDE BLD-SCNC: 104 MMOL/L (ref 98–111)
CO2: 28 MMOL/L (ref 22–29)
CREAT SERPL-MCNC: 0.8 MG/DL (ref 0.5–0.9)
EKG P AXIS: -1 DEGREES
EKG P-R INTERVAL: 112 MS
EKG Q-T INTERVAL: 454 MS
EKG QRS DURATION: 110 MS
EKG QTC CALCULATION (BAZETT): 439 MS
EKG T AXIS: 25 DEGREES
EOSINOPHILS ABSOLUTE: 0.2 K/UL (ref 0–0.6)
EOSINOPHILS RELATIVE PERCENT: 2.7 % (ref 0–5)
GFR AFRICAN AMERICAN: >59
GFR NON-AFRICAN AMERICAN: >60
GLUCOSE BLD-MCNC: 97 MG/DL (ref 74–109)
HCT VFR BLD CALC: 40.8 % (ref 37–47)
HEMOGLOBIN: 13 G/DL (ref 12–16)
IMMATURE GRANULOCYTES #: 0 K/UL
LYMPHOCYTES ABSOLUTE: 1.6 K/UL (ref 1.1–4.5)
LYMPHOCYTES RELATIVE PERCENT: 27.8 % (ref 20–40)
MCH RBC QN AUTO: 29.4 PG (ref 27–31)
MCHC RBC AUTO-ENTMCNC: 31.9 G/DL (ref 33–37)
MCV RBC AUTO: 92.3 FL (ref 81–99)
MONOCYTES ABSOLUTE: 0.5 K/UL (ref 0–0.9)
MONOCYTES RELATIVE PERCENT: 8.5 % (ref 0–10)
NEUTROPHILS ABSOLUTE: 3.4 K/UL (ref 1.5–7.5)
NEUTROPHILS RELATIVE PERCENT: 60.1 % (ref 50–65)
PDW BLD-RTO: 14.9 % (ref 11.5–14.5)
PLATELET # BLD: 245 K/UL (ref 130–400)
PMV BLD AUTO: 11.2 FL (ref 9.4–12.3)
POTASSIUM SERPL-SCNC: 4.1 MMOL/L (ref 3.5–5)
RBC # BLD: 4.42 M/UL (ref 4.2–5.4)
SODIUM BLD-SCNC: 143 MMOL/L (ref 136–145)
WBC # BLD: 5.7 K/UL (ref 4.8–10.8)

## 2020-09-14 PROCEDURE — 36415 COLL VENOUS BLD VENIPUNCTURE: CPT

## 2020-09-14 PROCEDURE — 80048 BASIC METABOLIC PNL TOTAL CA: CPT

## 2020-09-14 PROCEDURE — 99999 PR OFFICE/OUTPT VISIT,PROCEDURE ONLY: CPT | Performed by: NURSE PRACTITIONER

## 2020-09-14 PROCEDURE — 93005 ELECTROCARDIOGRAM TRACING: CPT | Performed by: ANESTHESIOLOGY

## 2020-09-14 PROCEDURE — 85025 COMPLETE CBC W/AUTO DIFF WBC: CPT

## 2020-09-16 ENCOUNTER — ANESTHESIA EVENT (OUTPATIENT)
Dept: OPERATING ROOM | Age: 55
End: 2020-09-16

## 2020-09-18 ENCOUNTER — PREP FOR PROCEDURE (OUTPATIENT)
Dept: SURGERY | Age: 55
End: 2020-09-18

## 2020-09-18 ENCOUNTER — HOSPITAL ENCOUNTER (OUTPATIENT)
Age: 55
Setting detail: OUTPATIENT SURGERY
Discharge: HOME | End: 2020-09-18
Attending: SURGERY | Admitting: SURGERY
Payer: COMMERCIAL

## 2020-09-18 ENCOUNTER — ANESTHESIA (OUTPATIENT)
Dept: OPERATING ROOM | Age: 55
End: 2020-09-18

## 2020-09-18 ENCOUNTER — HOSPITAL ENCOUNTER (OUTPATIENT)
Age: 55
Setting detail: SPECIMEN
Discharge: HOME | End: 2020-09-18
Payer: COMMERCIAL

## 2020-09-18 VITALS
RESPIRATION RATE: 16 BRPM | SYSTOLIC BLOOD PRESSURE: 104 MMHG | BODY MASS INDEX: 33.34 KG/M2 | HEIGHT: 68 IN | WEIGHT: 220 LBS | HEART RATE: 64 BPM | TEMPERATURE: 97.3 F | DIASTOLIC BLOOD PRESSURE: 48 MMHG | OXYGEN SATURATION: 100 %

## 2020-09-18 VITALS — DIASTOLIC BLOOD PRESSURE: 73 MMHG | OXYGEN SATURATION: 99 % | SYSTOLIC BLOOD PRESSURE: 138 MMHG

## 2020-09-18 PROCEDURE — 11406 EXC TR-EXT B9+MARG >4.0 CM: CPT

## 2020-09-18 PROCEDURE — 88305 TISSUE EXAM BY PATHOLOGIST: CPT

## 2020-09-18 PROCEDURE — 27337 EXC THIGH/KNEE LES SC 3 CM/>: CPT | Performed by: SURGERY

## 2020-09-18 RX ORDER — MORPHINE SULFATE 10 MG/ML
2 INJECTION, SOLUTION INTRAMUSCULAR; INTRAVENOUS
Status: DISCONTINUED | OUTPATIENT
Start: 2020-09-18 | End: 2020-09-18 | Stop reason: HOSPADM

## 2020-09-18 RX ORDER — HYDRALAZINE HYDROCHLORIDE 20 MG/ML
5 INJECTION INTRAMUSCULAR; INTRAVENOUS EVERY 10 MIN PRN
Status: DISCONTINUED | OUTPATIENT
Start: 2020-09-18 | End: 2020-09-18 | Stop reason: HOSPADM

## 2020-09-18 RX ORDER — MEPERIDINE HYDROCHLORIDE 25 MG/ML
12.5 INJECTION INTRAMUSCULAR; INTRAVENOUS; SUBCUTANEOUS EVERY 5 MIN PRN
Status: DISCONTINUED | OUTPATIENT
Start: 2020-09-18 | End: 2020-09-18 | Stop reason: HOSPADM

## 2020-09-18 RX ORDER — SODIUM CHLORIDE, SODIUM LACTATE, POTASSIUM CHLORIDE, CALCIUM CHLORIDE 600; 310; 30; 20 MG/100ML; MG/100ML; MG/100ML; MG/100ML
INJECTION, SOLUTION INTRAVENOUS CONTINUOUS
Status: DISCONTINUED | OUTPATIENT
Start: 2020-09-18 | End: 2020-09-18 | Stop reason: HOSPADM

## 2020-09-18 RX ORDER — ONDANSETRON 2 MG/ML
INJECTION INTRAMUSCULAR; INTRAVENOUS PRN
Status: DISCONTINUED | OUTPATIENT
Start: 2020-09-18 | End: 2020-09-18 | Stop reason: SDUPTHER

## 2020-09-18 RX ORDER — SODIUM CHLORIDE 0.9 % (FLUSH) 0.9 %
10 SYRINGE (ML) INJECTION EVERY 12 HOURS SCHEDULED
Status: CANCELLED | OUTPATIENT
Start: 2020-09-18

## 2020-09-18 RX ORDER — PROMETHAZINE HYDROCHLORIDE 25 MG/ML
12.5 INJECTION, SOLUTION INTRAMUSCULAR; INTRAVENOUS
Status: DISCONTINUED | OUTPATIENT
Start: 2020-09-18 | End: 2020-09-18 | Stop reason: HOSPADM

## 2020-09-18 RX ORDER — ONDANSETRON 2 MG/ML
4 INJECTION INTRAMUSCULAR; INTRAVENOUS EVERY 6 HOURS PRN
Status: CANCELLED | OUTPATIENT
Start: 2020-09-18

## 2020-09-18 RX ORDER — BUPIVACAINE HYDROCHLORIDE AND EPINEPHRINE 2.5; 5 MG/ML; UG/ML
INJECTION, SOLUTION INFILTRATION; PERINEURAL PRN
Status: DISCONTINUED | OUTPATIENT
Start: 2020-09-18 | End: 2020-09-18 | Stop reason: ALTCHOICE

## 2020-09-18 RX ORDER — ONDANSETRON 2 MG/ML
4 INJECTION INTRAMUSCULAR; INTRAVENOUS
Status: DISCONTINUED | OUTPATIENT
Start: 2020-09-18 | End: 2020-09-18 | Stop reason: HOSPADM

## 2020-09-18 RX ORDER — HYDROCODONE BITARTRATE AND ACETAMINOPHEN 5; 325 MG/1; MG/1
1 TABLET ORAL PRN
Status: DISCONTINUED | OUTPATIENT
Start: 2020-09-18 | End: 2020-09-18 | Stop reason: HOSPADM

## 2020-09-18 RX ORDER — MORPHINE SULFATE 10 MG/ML
4 INJECTION, SOLUTION INTRAMUSCULAR; INTRAVENOUS
Status: DISCONTINUED | OUTPATIENT
Start: 2020-09-18 | End: 2020-09-18 | Stop reason: HOSPADM

## 2020-09-18 RX ORDER — HYDROCODONE BITARTRATE AND ACETAMINOPHEN 5; 325 MG/1; MG/1
1 TABLET ORAL EVERY 4 HOURS PRN
Status: DISCONTINUED | OUTPATIENT
Start: 2020-09-18 | End: 2020-09-18 | Stop reason: HOSPADM

## 2020-09-18 RX ORDER — LIDOCAINE HYDROCHLORIDE 10 MG/ML
INJECTION, SOLUTION INFILTRATION; PERINEURAL PRN
Status: DISCONTINUED | OUTPATIENT
Start: 2020-09-18 | End: 2020-09-18 | Stop reason: SDUPTHER

## 2020-09-18 RX ORDER — FENTANYL CITRATE 50 UG/ML
INJECTION, SOLUTION INTRAMUSCULAR; INTRAVENOUS PRN
Status: DISCONTINUED | OUTPATIENT
Start: 2020-09-18 | End: 2020-09-18 | Stop reason: SDUPTHER

## 2020-09-18 RX ORDER — HYDROMORPHONE HCL 110MG/55ML
0.25 PATIENT CONTROLLED ANALGESIA SYRINGE INTRAVENOUS EVERY 5 MIN PRN
Status: DISCONTINUED | OUTPATIENT
Start: 2020-09-18 | End: 2020-09-18 | Stop reason: HOSPADM

## 2020-09-18 RX ORDER — LIDOCAINE HYDROCHLORIDE 10 MG/ML
1 INJECTION, SOLUTION EPIDURAL; INFILTRATION; INTRACAUDAL; PERINEURAL
Status: DISCONTINUED | OUTPATIENT
Start: 2020-09-18 | End: 2020-09-18 | Stop reason: HOSPADM

## 2020-09-18 RX ORDER — HYDROCODONE BITARTRATE AND ACETAMINOPHEN 5; 325 MG/1; MG/1
2 TABLET ORAL EVERY 4 HOURS PRN
Status: DISCONTINUED | OUTPATIENT
Start: 2020-09-18 | End: 2020-09-18 | Stop reason: HOSPADM

## 2020-09-18 RX ORDER — HYDROCODONE BITARTRATE AND ACETAMINOPHEN 5; 325 MG/1; MG/1
1 TABLET ORAL 2 TIMES DAILY PRN
Qty: 6 TABLET | Refills: 0 | Status: SHIPPED | OUTPATIENT
Start: 2020-09-18 | End: 2020-09-21

## 2020-09-18 RX ORDER — FENTANYL CITRATE 50 UG/ML
50 INJECTION, SOLUTION INTRAMUSCULAR; INTRAVENOUS EVERY 5 MIN PRN
Status: DISCONTINUED | OUTPATIENT
Start: 2020-09-18 | End: 2020-09-18 | Stop reason: HOSPADM

## 2020-09-18 RX ORDER — HYDROCODONE BITARTRATE AND ACETAMINOPHEN 5; 325 MG/1; MG/1
2 TABLET ORAL PRN
Status: DISCONTINUED | OUTPATIENT
Start: 2020-09-18 | End: 2020-09-18 | Stop reason: HOSPADM

## 2020-09-18 RX ORDER — PROMETHAZINE HYDROCHLORIDE 25 MG/1
12.5 TABLET ORAL EVERY 6 HOURS PRN
Status: CANCELLED | OUTPATIENT
Start: 2020-09-18

## 2020-09-18 RX ORDER — PROPOFOL 10 MG/ML
INJECTION, EMULSION INTRAVENOUS PRN
Status: DISCONTINUED | OUTPATIENT
Start: 2020-09-18 | End: 2020-09-18 | Stop reason: SDUPTHER

## 2020-09-18 RX ORDER — SODIUM CHLORIDE 0.9 % (FLUSH) 0.9 %
10 SYRINGE (ML) INJECTION PRN
Status: CANCELLED | OUTPATIENT
Start: 2020-09-18

## 2020-09-18 RX ORDER — MIDAZOLAM HYDROCHLORIDE 1 MG/ML
INJECTION INTRAMUSCULAR; INTRAVENOUS PRN
Status: DISCONTINUED | OUTPATIENT
Start: 2020-09-18 | End: 2020-09-18 | Stop reason: SDUPTHER

## 2020-09-18 RX ORDER — LABETALOL HYDROCHLORIDE 5 MG/ML
5 INJECTION, SOLUTION INTRAVENOUS EVERY 10 MIN PRN
Status: DISCONTINUED | OUTPATIENT
Start: 2020-09-18 | End: 2020-09-18 | Stop reason: HOSPADM

## 2020-09-18 RX ORDER — DIPHENHYDRAMINE HYDROCHLORIDE 50 MG/ML
12.5 INJECTION INTRAMUSCULAR; INTRAVENOUS
Status: DISCONTINUED | OUTPATIENT
Start: 2020-09-18 | End: 2020-09-18 | Stop reason: HOSPADM

## 2020-09-18 RX ORDER — HYDROMORPHONE HCL 110MG/55ML
0.5 PATIENT CONTROLLED ANALGESIA SYRINGE INTRAVENOUS EVERY 5 MIN PRN
Status: DISCONTINUED | OUTPATIENT
Start: 2020-09-18 | End: 2020-09-18 | Stop reason: HOSPADM

## 2020-09-18 RX ADMIN — PROPOFOL 250 MG: 10 INJECTION, EMULSION INTRAVENOUS at 09:00

## 2020-09-18 RX ADMIN — FENTANYL CITRATE 50 MCG: 50 INJECTION, SOLUTION INTRAMUSCULAR; INTRAVENOUS at 08:54

## 2020-09-18 RX ADMIN — ONDANSETRON 4 MG: 2 INJECTION INTRAMUSCULAR; INTRAVENOUS at 09:09

## 2020-09-18 RX ADMIN — LIDOCAINE HYDROCHLORIDE 30 MG: 10 INJECTION, SOLUTION INFILTRATION; PERINEURAL at 09:00

## 2020-09-18 RX ADMIN — MIDAZOLAM HYDROCHLORIDE 1 MG: 1 INJECTION INTRAMUSCULAR; INTRAVENOUS at 08:54

## 2020-09-18 RX ADMIN — SODIUM CHLORIDE, SODIUM LACTATE, POTASSIUM CHLORIDE, CALCIUM CHLORIDE: 600; 310; 30; 20 INJECTION, SOLUTION INTRAVENOUS at 08:04

## 2020-09-18 NOTE — INTERVAL H&P NOTE
Update History & Physical    The patient's History and Physical of September 1, 2020 was reviewed with the patient and I examined the patient. There was no change. The surgical site was confirmed by the patient and me. Plan: The risks, benefits, expected outcome, and alternative to the recommended procedure have been discussed with the patient. Patient understands and wants to proceed with the procedure.      Electronically signed by Dagoberto Khoury MD on 9/18/2020 at 8:49 AM

## 2020-09-18 NOTE — BRIEF OP NOTE
Brief Postoperative Note      Patient: Clay Patterson  YOB: 1965  MRN: [id number]    Date of Procedure: 9/18/2020    Pre-Op Diagnosis: RIGHT THIGH LIPOMA    Post-Op Diagnosis: Same       Procedure(s):  EXCISION RIGHT THIGH LIPOMA- 6 cm    Surgeon(s):  Yoanna Nance MD    Assistant:  * No surgical staff found *    Anesthesia: Monitor Anesthesia Care    Estimated Blood Loss (mL): Minimal    Complications: None    Specimens:   ID Type Source Tests Collected by Time Destination   A : RT THIGH MASS Tissue Thigh SURGICAL PATHOLOGY Yoanna Nance MD 9/18/2020 5526        Implants:  * No implants in log *      Drains: * No LDAs found *    Findings: 6 cm lipoma, easily  from underlying subcutaneous tissue    Electronically signed by Yoanna Nance MD on 9/18/2020 at 9:39 AM

## 2020-09-18 NOTE — ANESTHESIA POSTPROCEDURE EVALUATION
Department of Anesthesiology  Postprocedure Note    Patient: Donna Grubbs  MRN: [id number]  YOB: 1965  Date of evaluation: 9/18/2020  Time:  9:18 AM     Procedure Summary     Date:  09/18/20 Room / Location:  [address]    Anesthesia Start:  2760 Anesthesia Stop:      Procedure:  EXCISION RIGHT THIGH LIPOMA (Right Thigh) Diagnosis:  (RIGHT THIGH LIPOMA)    Surgeon:  Opal Hendrix MD Responsible Provider: TU Lyn CRNA    Anesthesia Type:  general, TIVA ASA Status:  2          Anesthesia Type: general, TIVA    Lindy Phase I:      Lindy Phase II:      Last vitals: Reviewed and per EMR flowsheets.        Anesthesia Post Evaluation    Patient location during evaluation: bedside  Patient participation: complete - patient participated  Level of consciousness: sleepy but conscious  Pain score: 0  Airway patency: patent  Nausea & Vomiting: no nausea and no vomiting  Complications: no  Cardiovascular status: blood pressure returned to baseline  Respiratory status: acceptable, room air and spontaneous ventilation  Hydration status: euvolemic

## 2020-09-18 NOTE — H&P (VIEW-ONLY)
Subjective:      Patient ID: Avril Jefferson Self a 60 y. o. female.     HPI      Ms. Robledo is a 60year old female who presents with a complaint of a lump on her right thigh. She was seen for this previously, and presents at this time to update for surgery. She reports that this has been there for about 4-5 years. She reports that it has gotten a little larger, and is more prominent due to recent weight loss. She denies any pain, but again, has gotten larger.  She denies any skin changes and no drainage.      Past Medical History           Past Medical History:   Diagnosis Date    Hypertension           Past Surgical History             Past Surgical History:   Procedure Laterality Date     SECTION        x1    FOOT SURGERY Left     [place] COLONOSCOPY FLX DX W/COLLJ SPEC WHEN PFRMD N/A 2017     Dr Luda Waters, 10 yr recall    TUBAL LIGATION                        Current Outpatient Medications on File Prior to Visit   Medication Sig Dispense Refill    estrogens, conjugated, (PREMARIN) 0.3 MG tablet Take 1 tablet by mouth daily 90 tablet 3    medroxyPROGESTERone (PROVERA) 2.5 MG tablet Take 1 tablet by mouth daily 90 tablet 3    lisinopril-hydrochlorothiazide (PRINZIDE;ZESTORETIC) 10-12.5 MG per tablet Take 1 tablet by mouth daily        estrogen, conjugated,-medroxyprogesterone (PREMPRO) 0.3-1.5 MG per tablet Take 1 tablet by mouth daily 30 tablet 11    Multiple Vitamins-Minerals (MULTIVITAMIN ADULT PO) Take by mouth        Cholecalciferol (VITAMIN D3) 13490 UNITS CAPS Take by mouth        Omega-3 Fatty Acids (OMEGA 3 PO) Take by mouth        meloxicam (MOBIC) 15 MG tablet          Probiotic Product (PROBIOTIC DAILY PO) Take by mouth          No current facility-administered medications on file prior to visit.       Allergies: Patient has no known allergies.     Family History             Family History   Problem Relation Age of Onset    Heart Disease Maternal Uncle      Colon Cancer Maternal Grandfather      Dementia Mother      Colon Polyps Neg Hx      Liver Cancer Neg Hx      Liver Disease Neg Hx      Esophageal Cancer Neg Hx      Rectal Cancer Neg Hx      Stomach Cancer Neg Hx              Social History                Tobacco Use    Smoking status: Never Smoker    Smokeless tobacco: Never Used   Substance Use Topics    Alcohol use: Yes       Comment: occ            Review of Systems   Constitutional: Negative for activity change and appetite change. HENT: Negative for congestion and rhinorrhea.    Eyes: Negative for pain and redness. Respiratory: Negative for choking and shortness of breath.    Gastrointestinal: Negative for abdominal distention and abdominal pain. Endocrine: Negative for polydipsia and polyphagia. Genitourinary: Negative for dysuria and hematuria. Musculoskeletal: Positive for arthralgias. Negative for back pain. Skin: Negative for rash and wound. Neurological: Negative for dizziness and headaches. Psychiatric/Behavioral: Negative for confusion and dysphoric mood.         Objective:   Physical Exam  Vitals signs reviewed. Constitutional:       General: She is not in acute distress.     Appearance: Normal appearance. HENT:      Head: Normocephalic and atraumatic.      Nose: Nose normal.      Mouth/Throat:      Mouth: Mucous membranes are moist.   Eyes:      Extraocular Movements: Extraocular movements intact.      Pupils: Pupils are equal, round, and reactive to light. Neck:      Musculoskeletal: Neck supple. Cardiovascular:      Rate and Rhythm: Normal rate and regular rhythm. Pulmonary:      Effort: Pulmonary effort is normal. No respiratory distress. Abdominal:      General: There is no distension.      Palpations: Abdomen is soft. Musculoskeletal:         General: No swelling or deformity.         Legs:       Comments: Approximately 6 cm mass, consistent with lipoma   Lymphadenopathy:      Cervical: No cervical

## 2020-09-18 NOTE — H&P
Subjective:      Patient ID: Avril Caro a 60 y. o. female.     HPI      Ms. Robledo is a 60year old female who presents with a complaint of a lump on her right thigh. She was seen for this previously, and presents at this time to update for surgery. She reports that this has been there for about 4-5 years. She reports that it has gotten a little larger, and is more prominent due to recent weight loss. She denies any pain, but again, has gotten larger.  She denies any skin changes and no drainage.      Past Medical History           Past Medical History:   Diagnosis Date    Hypertension           Past Surgical History             Past Surgical History:   Procedure Laterality Date     SECTION        x1    FOOT SURGERY Left     [place] COLONOSCOPY FLX DX W/COLLJ SPEC WHEN PFRMD N/A 2017     Dr Mercedes Dakin, 10 yr recall    TUBAL LIGATION                        Current Outpatient Medications on File Prior to Visit   Medication Sig Dispense Refill    estrogens, conjugated, (PREMARIN) 0.3 MG tablet Take 1 tablet by mouth daily 90 tablet 3    medroxyPROGESTERone (PROVERA) 2.5 MG tablet Take 1 tablet by mouth daily 90 tablet 3    lisinopril-hydrochlorothiazide (PRINZIDE;ZESTORETIC) 10-12.5 MG per tablet Take 1 tablet by mouth daily        estrogen, conjugated,-medroxyprogesterone (PREMPRO) 0.3-1.5 MG per tablet Take 1 tablet by mouth daily 30 tablet 11    Multiple Vitamins-Minerals (MULTIVITAMIN ADULT PO) Take by mouth        Cholecalciferol (VITAMIN D3) 28359 UNITS CAPS Take by mouth        Omega-3 Fatty Acids (OMEGA 3 PO) Take by mouth        meloxicam (MOBIC) 15 MG tablet          Probiotic Product (PROBIOTIC DAILY PO) Take by mouth          No current facility-administered medications on file prior to visit.       Allergies: Patient has no known allergies.     Family History             Family History   Problem Relation Age of Onset    Heart Disease Maternal Uncle      Colon Cancer Maternal Grandfather      Dementia Mother      Colon Polyps Neg Hx      Liver Cancer Neg Hx      Liver Disease Neg Hx      Esophageal Cancer Neg Hx      Rectal Cancer Neg Hx      Stomach Cancer Neg Hx              Social History                Tobacco Use    Smoking status: Never Smoker    Smokeless tobacco: Never Used   Substance Use Topics    Alcohol use: Yes       Comment: occ            Review of Systems   Constitutional: Negative for activity change and appetite change. HENT: Negative for congestion and rhinorrhea.    Eyes: Negative for pain and redness. Respiratory: Negative for choking and shortness of breath.    Gastrointestinal: Negative for abdominal distention and abdominal pain. Endocrine: Negative for polydipsia and polyphagia. Genitourinary: Negative for dysuria and hematuria. Musculoskeletal: Positive for arthralgias. Negative for back pain. Skin: Negative for rash and wound. Neurological: Negative for dizziness and headaches. Psychiatric/Behavioral: Negative for confusion and dysphoric mood.         Objective:   Physical Exam  Vitals signs reviewed. Constitutional:       General: She is not in acute distress.     Appearance: Normal appearance. HENT:      Head: Normocephalic and atraumatic.      Nose: Nose normal.      Mouth/Throat:      Mouth: Mucous membranes are moist.   Eyes:      Extraocular Movements: Extraocular movements intact.      Pupils: Pupils are equal, round, and reactive to light. Neck:      Musculoskeletal: Neck supple. Cardiovascular:      Rate and Rhythm: Normal rate and regular rhythm. Pulmonary:      Effort: Pulmonary effort is normal. No respiratory distress. Abdominal:      General: There is no distension.      Palpations: Abdomen is soft. Musculoskeletal:         General: No swelling or deformity.         Legs:       Comments: Approximately 6 cm mass, consistent with lipoma   Lymphadenopathy:      Cervical: No cervical

## 2020-09-18 NOTE — ANESTHESIA PRE PROCEDURE
Department of Anesthesiology  Preprocedure Note       Name:  Vazquez Skinner   Age:  60 y.o.  :  1965                                          MRN:  [id number]         Date:  2020      Surgeon: Ranulfo Hidalgo):  Deyvi Alvarado MD    Procedure: Procedure(s):  EXCISION RIGHT THIGH LIPOMA    Medications prior to admission:   Prior to Admission medications    Medication Sig Start Date End Date Taking?  Authorizing Provider   meloxicam (MOBIC) 15 MG tablet  8/3/20  Yes Historical Provider, MD   estrogens, conjugated, (PREMARIN) 0.3 MG tablet Take 1 tablet by mouth daily 20  Yes Cici Ng MD   medroxyPROGESTERone (PROVERA) 2.5 MG tablet Take 1 tablet by mouth daily 20  Yes Cici Ng MD   lisinopril-hydrochlorothiazide (PRINZIDE;ZESTORETIC) 10-12.5 MG per tablet Take 1 tablet by mouth daily   Yes Historical Provider, MD   Multiple Vitamins-Minerals (MULTIVITAMIN ADULT PO) Take by mouth   Yes Historical Provider, MD   Cholecalciferol (VITAMIN D3) 67179 UNITS CAPS Take by mouth   Yes Historical Provider, MD   Omega-3 Fatty Acids (OMEGA 3 PO) Take by mouth   Yes Historical Provider, MD   Probiotic Product (PROBIOTIC DAILY PO) Take by mouth   Yes Historical Provider, MD       Current medications:    Current Facility-Administered Medications   Medication Dose Route Frequency Provider Last Rate Last Dose    lactated ringers infusion   Intravenous Continuous TU Bynum - CRNA 125 mL/hr at 20 0804      lidocaine PF 1 % injection 1 mL  1 mL Intradermal Once PRN TU Bynum CRNA           Allergies:  No Known Allergies    Problem List:    Patient Active Problem List   Diagnosis Code   (none) - all problems resolved or deleted       Past Medical History:        Diagnosis Date    Hypertension        Past Surgical History:        Procedure Laterality Date     SECTION  1991    x1    FOOT SURGERY Left 2016    [place] COLONOSCOPY FLX DX W/COLLJ SPEC WHEN PFRMD N/A 2017 Dr Milind Lui, 10 yr recall    TUBAL LIGATION  1991       Social History:    Social History     Tobacco Use    Smoking status: Never Smoker    Smokeless tobacco: Never Used   Substance Use Topics    Alcohol use: Yes     Comment: occ                                Counseling given: Not Answered      Vital Signs (Current):   Vitals:    09/18/20 0759   BP: 130/73   Pulse: 52   Resp: 18   Temp: 97.3 °F (36.3 °C)   SpO2: 99%   Weight: 220 lb (99.8 kg)   Height: 5' 8\" (1.727 m)                                              BP Readings from Last 3 Encounters:   09/18/20 130/73   09/01/20 136/84   08/11/20 136/74       NPO Status: Time of last liquid consumption: 2300                        Time of last solid consumption: 2300                        Date of last liquid consumption: 09/17/20                        Date of last solid food consumption: 09/17/20    BMI:   Wt Readings from Last 3 Encounters:   09/18/20 220 lb (99.8 kg)   09/01/20 228 lb (103.4 kg)   08/11/20 228 lb (103.4 kg)     Body mass index is 33.45 kg/m². CBC:   Lab Results   Component Value Date    WBC 5.7 09/14/2020    RBC 4.42 09/14/2020    HGB 13.0 09/14/2020    HCT 40.8 09/14/2020    MCV 92.3 09/14/2020    RDW 14.9 09/14/2020     09/14/2020       CMP:   Lab Results   Component Value Date     09/14/2020    K 4.1 09/14/2020     09/14/2020    CO2 28 09/14/2020    BUN 17 09/14/2020    CREATININE 0.8 09/14/2020    GFRAA >59 09/14/2020    LABGLOM >60 09/14/2020    GLUCOSE 97 09/14/2020    CALCIUM 9.6 09/14/2020       POC Tests: No results for input(s): POCGLU, POCNA, POCK, POCCL, POCBUN, POCHEMO, POCHCT in the last 72 hours.     Coags: No results found for: PROTIME, INR, APTT    HCG (If Applicable):   Lab Results   Component Value Date    PREGTESTUR Negative 05/25/2017        ABGs: No results found for: PHART, PO2ART, NBJ5DCK, LSU5YWB, BEART, N6LTCADP     Type & Screen (If Applicable):  No results found for: LABABO, [address]    Drug/Infectious Status (If Applicable):  No results found for: HIV, HEPCAB    COVID-19 Screening (If Applicable):   Lab Results   Component Value Date    COVID19 NOT DETECTED 09/14/2020         Anesthesia Evaluation  Patient summary reviewed and Nursing notes reviewed  Airway: Mallampati: II  TM distance: >3 FB   Neck ROM: full  Mouth opening: > = 3 FB Dental: normal exam         Pulmonary:Negative Pulmonary ROS and normal exam                               Cardiovascular:    (+) hypertension: no interval change,                   Neuro/Psych:   Negative Neuro/Psych ROS              GI/Hepatic/Renal: Neg GI/Hepatic/Renal ROS            Endo/Other: Negative Endo/Other ROS                    Abdominal:           Vascular: negative vascular ROS. Anesthesia Plan      general and TIVA     ASA 2       Induction: intravenous. MIPS: Postoperative opioids intended and Prophylactic antiemetics administered. Anesthetic plan and risks discussed with patient. Plan discussed with CRNA.                   TU Kemp - CRNA   9/18/2020

## 2020-09-21 NOTE — OP NOTE
Operative Report    PATIENT NAME: Sharon De Jesus RECORD NO. [id number]  SURGEON: Rowan Ragsdale MD   Primary Care Physician: David Montaño MD  Date: 9/18/2020   PROCEDURE PERFORMED: excision right thigh lipoma 6 cm  PREOPERATIVE DIAGNOSIS: right thigh lipoma  POSTOPERATIVE DIAGNOSIS: Same  SURGEON:  Rowan Ragsdale MD   ANESTHESIA:  MAC  ESTIMATED BLOOD LOSS: minimal  SPECIMEN:  Left thigh lipoma  COMPLICATIONS:  None; patient tolerated the procedure well. FINDINGS: fatty mass, consistent with lipoma   DISPOSITION: PACU - hemodynamically stable. CONDITION: stable      Indications: The patient is a 60year old female who presented with a complaint of a mass on her right thigh. Procedure Details     After the risks and benefits of the procedure were explained, informed consent was obtained, and the patient was taken to the operating room. The patient was placed in supine position and anesthesia was begun. The right thigh was prepped and draped in normal sterile fashion. A time out was performed. Local anesthetic was injected over the palpable mass. A longitudinal incision was made over the mass. Soft tissue was dissected with cautery. The mass was dissected circumferentially using cautery, blunt dissection, and fortunato scissors. There was good hemostasis. The wound was reapproximated with deep layer of 2-0 vicryl interrupted stitches. The skin was then closed with 4-0 monocryl running subcuticular stitches. Sterile dressings with dermabond was applied. The patient tolerated the procedure well, was removed from anesthesia, and transferred to the recovery area in stable condition.                   Rowan Ragsdale MD   Electronically signed 09/21/20 3:53 PM

## 2020-09-25 LAB — SARS-COV-2, NAA: NOT DETECTED

## 2020-10-01 ENCOUNTER — OFFICE VISIT (OUTPATIENT)
Dept: SURGERY | Age: 55
End: 2020-10-01

## 2020-10-01 VITALS
WEIGHT: 225 LBS | SYSTOLIC BLOOD PRESSURE: 116 MMHG | DIASTOLIC BLOOD PRESSURE: 76 MMHG | BODY MASS INDEX: 34.1 KG/M2 | OXYGEN SATURATION: 95 % | HEIGHT: 68 IN | HEART RATE: 74 BPM

## 2020-10-01 PROCEDURE — 99024 POSTOP FOLLOW-UP VISIT: CPT | Performed by: SURGERY

## 2021-04-29 ENCOUNTER — TELEPHONE (OUTPATIENT)
Dept: OBGYN CLINIC | Age: 56
End: 2021-04-29

## 2021-04-29 NOTE — TELEPHONE ENCOUNTER
Patient called and said her insurance contacted her about changing her premarin to estradiol. She really liked the premarin and it worked for her so she is hoping the estradiol will too.

## 2021-04-30 RX ORDER — ESTRADIOL 0.5 MG/1
0.5 TABLET ORAL DAILY
Qty: 90 TABLET | Refills: 3 | Status: SHIPPED | OUTPATIENT
Start: 2021-04-30 | End: 2021-08-16 | Stop reason: SDUPTHER

## 2021-04-30 NOTE — TELEPHONE ENCOUNTER
Informed pt that it should still help since it is just estrogen as well. rx sent in and pt will notify office if not helping.

## 2021-08-12 ENCOUNTER — TELEPHONE (OUTPATIENT)
Dept: OBGYN CLINIC | Age: 56
End: 2021-08-12

## 2021-08-12 NOTE — TELEPHONE ENCOUNTER
I do not have anything with AW for a physical either. She would rather wait until she could see her if we would send  in refills.

## 2021-08-12 NOTE — TELEPHONE ENCOUNTER
Perri Apley called to schedule a appt. for physical with pap and refills. I didn't have anything for this year with Dr. Beny Pichardo. Pt. only wants to see Dr. Beny Pichardo. Pt. is needing a refill on her hormone medicne. Said if a 3 month supply could be called in she didn't care to have physical scheduled for next year. .     Please be advised that the best time to call her to accommodate their needs is Anytime. Thank you.

## 2021-08-16 DIAGNOSIS — Z12.31 ENCOUNTER FOR SCREENING MAMMOGRAM FOR BREAST CANCER: Primary | ICD-10-CM

## 2021-08-16 RX ORDER — MEDROXYPROGESTERONE ACETATE 2.5 MG/1
2.5 TABLET ORAL DAILY
Qty: 90 TABLET | Refills: 1 | Status: SHIPPED | OUTPATIENT
Start: 2021-08-16 | End: 2022-01-05

## 2021-08-16 RX ORDER — ESTRADIOL 0.5 MG/1
0.5 TABLET ORAL DAILY
Qty: 90 TABLET | Refills: 1 | Status: SHIPPED | OUTPATIENT
Start: 2021-08-16 | End: 2022-01-05

## 2021-08-16 NOTE — TELEPHONE ENCOUNTER
I called pt back and schedule appt for January. Ashwin Arboleda placed Mammo order. Pt would like to get it done on the same day.

## 2022-01-05 ENCOUNTER — OFFICE VISIT (OUTPATIENT)
Dept: OBGYN CLINIC | Age: 57
End: 2022-01-05
Payer: COMMERCIAL

## 2022-01-05 VITALS
HEART RATE: 66 BPM | DIASTOLIC BLOOD PRESSURE: 78 MMHG | WEIGHT: 203 LBS | SYSTOLIC BLOOD PRESSURE: 139 MMHG | HEIGHT: 68 IN | BODY MASS INDEX: 30.77 KG/M2

## 2022-01-05 DIAGNOSIS — Z01.419 WELL WOMAN EXAM WITH ROUTINE GYNECOLOGICAL EXAM: Primary | ICD-10-CM

## 2022-01-05 DIAGNOSIS — Z12.4 SCREENING FOR CERVICAL CANCER: ICD-10-CM

## 2022-01-05 DIAGNOSIS — Z11.51 SCREENING FOR HPV (HUMAN PAPILLOMAVIRUS): ICD-10-CM

## 2022-01-05 PROCEDURE — 99396 PREV VISIT EST AGE 40-64: CPT | Performed by: OBSTETRICS & GYNECOLOGY

## 2022-01-05 ASSESSMENT — ENCOUNTER SYMPTOMS
EYES NEGATIVE: 1
GASTROINTESTINAL NEGATIVE: 1
RESPIRATORY NEGATIVE: 1

## 2022-01-05 NOTE — PROGRESS NOTES
I, Brianne Vásquez RN, am scribing for and in the presence of Dr. Queen Subramanian 2022/11:03 AM/sign         2022    Zabrina Valerio (:  1965) is a 60 y.o. female, here for a preventive medicine evaluation. Pt states she had to reschedule mammogram that was scheduled for today due to recently receiving the covid booster. She stopped taking the hormones due to not wanting to take as many meds. She will have occasional hot flashes, but tolerable. Patient Active Problem List   Diagnosis   (none) - all problems resolved or deleted       Review of Systems   Constitutional: Negative. HENT: Negative. Eyes: Negative. Respiratory: Negative. Cardiovascular: Negative. Gastrointestinal: Negative. Genitourinary: Negative for difficulty urinating, dyspareunia, dysuria, enuresis, frequency, hematuria, menstrual problem, pelvic pain, urgency and vaginal discharge. Musculoskeletal: Negative. Skin: Negative. Neurological: Negative. Psychiatric/Behavioral: Negative. Prior to Visit Medications    Medication Sig Taking?  Authorizing Provider   Apple Cider Vinegar 300 MG TABS Take by mouth Yes Historical Provider, MD   PHENTERMINE HCL PO Take 17 mg by mouth Yes Historical Provider, MD   meloxicam (MOBIC) 15 MG tablet  Yes Historical Provider, MD   lisinopril-hydrochlorothiazide (PRINZIDE;ZESTORETIC) 10-12.5 MG per tablet Take 1 tablet by mouth every other day  Yes Historical Provider, MD   Multiple Vitamins-Minerals (MULTIVITAMIN ADULT PO) Take by mouth Yes Historical Provider, MD   Cholecalciferol (VITAMIN D3) 10454 UNITS CAPS Take by mouth Yes Historical Provider, MD   Omega-3 Fatty Acids (OMEGA 3 PO) Take by mouth Yes Historical Provider, MD   Probiotic Product (PROBIOTIC DAILY PO) Take by mouth Yes Historical Provider, MD        No Known Allergies    Past Medical History:   Diagnosis Date    Hypertension        Past Surgical History:   Procedure Laterality Date     SECTION  1991    x1    FOOT SURGERY Left 2016    CO COLONOSCOPY FLX DX W/COLLJ SPEC WHEN PFRMD N/A 5/25/2017    Dr Devan Green, 10 yr recall    SKIN BIOPSY Right 9/18/2020    EXCISION RIGHT THIGH LIPOMA performed by Viviane Valencia [facility] at [facility]       Social History     Socioeconomic History    Marital status:      Spouse name: Not on file    Number of children: Not on file    Years of education: Not on file    Highest education level: Not on file   Occupational History    Not on file   Tobacco Use    Smoking status: Never Smoker    Smokeless tobacco: Never Used   Substance and Sexual Activity    Alcohol use: Yes     Comment: occ    Drug use: No    Sexual activity: Yes     Partners: Male     Birth control/protection: Surgical   Other Topics Concern    Not on file   Social History Narrative    Not on file     Social Determinants of Health     Financial Resource Strain:     Difficulty of Paying Living Expenses: Not on file   Food Insecurity:     Worried About Running Out of Food in the Last Year: Not on file    Matt of Food in the Last Year: Not on file   Transportation Needs:     Lack of Transportation (Medical): Not on file    Lack of Transportation (Non-Medical):  Not on file   Physical Activity:     Days of Exercise per Week: Not on file    Minutes of Exercise per Session: Not on file   Stress:     Feeling of Stress : Not on file   Social Connections:     Frequency of Communication with Friends and Family: Not on file    Frequency of Social Gatherings with Friends and Family: Not on file    Attends [employer] Services: Not on file    Active Member of Clubs or Organizations: Not on file    Attends Club or Organization Meetings: Not on file    Marital Status: Not on file   Intimate Partner Violence:     Fear of Current or Ex-Partner: Not on file    Emotionally Abused: Not on file    Physically Abused: Not on file    Sexually Abused: Not on file Housing Stability:     Unable to Pay for Housing in the Last Year: Not on file    Number of Places Lived in the Last Year: Not on file    Unstable Housing in the Last Year: Not on file        Family History   Problem Relation Age of Onset    Heart Disease Maternal Uncle     Colon Cancer Maternal Grandfather     Dementia Mother     Colon Polyps Neg Hx     Liver Cancer Neg Hx     Liver Disease Neg Hx     Esophageal Cancer Neg Hx     Rectal Cancer Neg Hx     Stomach Cancer Neg Hx        ADVANCE DIRECTIVE: N, <no information>    Vitals:    01/05/22 1031   BP: 139/78   Site: Right Upper Arm   Position: Sitting   Cuff Size: Large Adult   Pulse: 66   Weight: 203 lb (92.1 kg)   Height: 5' 8\" (1.727 m)     Estimated body mass index is 30.87 kg/m² as calculated from the following:    Height as of this encounter: 5' 8\" (1.727 m). Weight as of this encounter: 203 lb (92.1 kg). Physical Exam  Constitutional:       General: She is not in acute distress. Appearance: She is well-developed. HENT:      Head: Normocephalic and atraumatic. Right Ear: Hearing normal.      Left Ear: Hearing normal.      Nose: Nose normal.   Eyes:      General: Lids are normal.      Conjunctiva/sclera: Conjunctivae normal.      Pupils: Pupils are equal, round, and reactive to light. Neck:      Thyroid: No thyromegaly. Trachea: No tracheal deviation. Cardiovascular:      Rate and Rhythm: Normal rate and regular rhythm. Heart sounds: Normal heart sounds. Pulmonary:      Effort: Pulmonary effort is normal. No respiratory distress. Breath sounds: Normal breath sounds. No wheezing. Chest:   Breasts: Breasts are symmetrical.      Right: No inverted nipple, mass, nipple discharge, skin change, tenderness or supraclavicular adenopathy. Left: No inverted nipple, mass, nipple discharge, skin change, tenderness or supraclavicular adenopathy. Abdominal:      General: There is no distension. Palpations: Abdomen is soft. Tenderness: There is no abdominal tenderness. There is no guarding or rebound. Genitourinary:     Labia:         Right: No rash, tenderness or lesion. Left: No rash, tenderness or lesion. Vagina: No vaginal discharge or bleeding. Cervix: No cervical motion tenderness, discharge or friability. Uterus: Not deviated, not enlarged, not fixed and not tender. Adnexa:         Right: No mass, tenderness or fullness. Left: No mass, tenderness or fullness. Rectum: No anal fissure or external hemorrhoid. Comments: Specimen for cervical cytology obtained. Musculoskeletal:         General: Normal range of motion. Cervical back: Normal range of motion and neck supple. Comments: Normal ROM in all four extremities; normal gait   Lymphadenopathy:      Head:      Right side of head: No submental, submandibular or tonsillar adenopathy. Left side of head: No submental, submandibular or tonsillar adenopathy. Cervical: No cervical adenopathy. Upper Body:      Right upper body: No supraclavicular adenopathy. Left upper body: No supraclavicular adenopathy. Skin:     General: Skin is warm and dry. Findings: No erythema or rash. Neurological:      Mental Status: She is alert and oriented to person, place, and time. Psychiatric:         Behavior: Behavior normal.         No flowsheet data found.     Lab Results   Component Value Date    GLUCOSE 97 09/14/2020       The ASCVD Risk score (Mirian Nunes., et al., 2013) failed to calculate for the following reasons:    Cannot find a previous HDL lab    Cannot find a previous total cholesterol lab    Immunization History   Administered Date(s) Administered    COVID-19, Moderna, Booster, PF, 50mcg/0.25ml 12/14/2021    COVID-19, Graciella Belling, Primary or Immunocompromised, PF, 100mcg/0.5mL 03/24/2021, 04/21/2021    Influenza Vaccine, unspecified formulation 10/21/2016       [facility] Maintenance   Topic Date Due    Hepatitis C screen  Never done    Depression Screen  Never done    HIV screen  Never done    DTaP/Tdap/Td vaccine (1 - Tdap) Never done    Lipid screen  Never done    Shingles Vaccine (1 of 2) Never done    Flu vaccine (1) 09/01/2021    Potassium monitoring  09/14/2021    Creatinine monitoring  09/14/2021    Breast cancer screen  08/04/2022    Cervical cancer screen  08/04/2025    Colon cancer screen colonoscopy  05/25/2027    COVID-19 Vaccine  Completed    Hepatitis A vaccine  Aged Out    Hepatitis B vaccine  Aged Out    Hib vaccine  Aged Out    Meningococcal (ACWY) vaccine  Aged Out    Pneumococcal 0-64 years Vaccine  Aged Out          ASSESSMENT/PLAN:  1. Well woman exam with routine gynecological exam  -     PAP SMEAR  2. Screening for cervical cancer  -     PAP SMEAR  3. Screening for HPV (human papillomavirus)  -     Human papillomavirus (HPV) DNA Probe Thin Prep High Risk    The importance of self-breast examination was discussed, as well as yearly mammograms after age 36. A well balanced diet and exercise were encouraged. The risk factors for osteopenia/osteoporosis were discussed along with need for additional calcium and vitamin D. A colonoscopy is recommended at age 48 unless otherwise indicated based on symptoms or family history. Pt has mammogram rescheduled for March. Return in about 1 year (around 1/5/2023), or if symptoms worsen or fail to improve. An electronic signature was used to authenticate this note. Nate Massey MD, personally performed services described in this document as scribed by Stuart Way RN in my presence, and it is both accurate and complete.

## 2022-03-11 ENCOUNTER — HOSPITAL ENCOUNTER (OUTPATIENT)
Dept: WOMENS IMAGING | Age: 57
Discharge: HOME | End: 2022-03-11
Payer: COMMERCIAL

## 2022-03-11 DIAGNOSIS — Z12.31 ENCOUNTER FOR SCREENING MAMMOGRAM FOR BREAST CANCER: ICD-10-CM

## 2022-03-11 PROCEDURE — 77067 SCR MAMMO BI INCL CAD: CPT

## 2023-10-20 ENCOUNTER — TELEPHONE (OUTPATIENT)
Dept: OBGYN CLINIC | Age: 58
End: 2023-10-20

## 2023-10-20 DIAGNOSIS — Z12.31 ENCOUNTER FOR SCREENING MAMMOGRAM FOR MALIGNANT NEOPLASM OF BREAST: Primary | ICD-10-CM

## 2023-10-20 NOTE — TELEPHONE ENCOUNTER
Pt wants to est care with laura now. Wanting to charles mamm and phy together informed her that we would get order put in for mamm and give her a call back to get those both scheduled.  Can you please put the order in?

## 2023-10-20 NOTE — TELEPHONE ENCOUNTER
Franciscojunior Laguna called to schedule an appointment for Physical and her mammogram on the same day. There was not an order for the mammo so I was unable to coordinate the appointments. [facility] was unable to accommodate in the time frame needed. Please be advised that the best time to call Anytime. Thank you.

## 2023-10-20 NOTE — TELEPHONE ENCOUNTER
Called patient and got her an appt with Sandeep Lerma. Put her mammo order in and gave her scheduling number so she can call them to get mammo scheduled.  Louisa PARRISH

## 2023-11-09 ENCOUNTER — OFFICE VISIT (OUTPATIENT)
Dept: OBGYN CLINIC | Age: 58
End: 2023-11-09
Payer: COMMERCIAL

## 2023-11-09 VITALS
HEART RATE: 62 BPM | DIASTOLIC BLOOD PRESSURE: 78 MMHG | SYSTOLIC BLOOD PRESSURE: 133 MMHG | HEIGHT: 68 IN | WEIGHT: 219 LBS | BODY MASS INDEX: 33.19 KG/M2

## 2023-11-09 DIAGNOSIS — Z12.39 ENCOUNTER FOR SCREENING BREAST EXAMINATION: ICD-10-CM

## 2023-11-09 DIAGNOSIS — M54.2 CHRONIC NECK PAIN: ICD-10-CM

## 2023-11-09 DIAGNOSIS — G89.29 CHRONIC NECK PAIN: ICD-10-CM

## 2023-11-09 DIAGNOSIS — M25.511 CHRONIC PAIN OF BOTH SHOULDERS: ICD-10-CM

## 2023-11-09 DIAGNOSIS — Z13.31 DEPRESSION SCREENING NEGATIVE: ICD-10-CM

## 2023-11-09 DIAGNOSIS — Z11.51 SCREENING FOR HPV (HUMAN PAPILLOMAVIRUS): ICD-10-CM

## 2023-11-09 DIAGNOSIS — G89.29 CHRONIC PAIN OF BOTH SHOULDERS: ICD-10-CM

## 2023-11-09 DIAGNOSIS — N62 LARGE BREASTS: ICD-10-CM

## 2023-11-09 DIAGNOSIS — N64.89 BILATERAL PENDULOUS BREASTS: ICD-10-CM

## 2023-11-09 DIAGNOSIS — Z13.31 ENCOUNTER FOR SCREENING FOR DEPRESSION: ICD-10-CM

## 2023-11-09 DIAGNOSIS — Z12.4 ENCOUNTER FOR SCREENING FOR CERVICAL CANCER: ICD-10-CM

## 2023-11-09 DIAGNOSIS — Z12.31 SCREENING MAMMOGRAM FOR BREAST CANCER: ICD-10-CM

## 2023-11-09 DIAGNOSIS — Z01.419 ENCOUNTER FOR ANNUAL ROUTINE GYNECOLOGICAL EXAMINATION: Primary | ICD-10-CM

## 2023-11-09 DIAGNOSIS — M25.512 CHRONIC PAIN OF BOTH SHOULDERS: ICD-10-CM

## 2023-11-09 LAB — HPV16+18+H RISK 12 DNA SPEC-IMP: NORMAL

## 2023-11-09 PROCEDURE — 99396 PREV VISIT EST AGE 40-64: CPT

## 2023-11-09 RX ORDER — VIT C/B6/B5/MAGNESIUM/HERB 173 50-5-6-5MG
CAPSULE ORAL DAILY
COMMUNITY

## 2023-11-09 ASSESSMENT — ANXIETY QUESTIONNAIRES
1. FEELING NERVOUS, ANXIOUS, OR ON EDGE: 0
5. BEING SO RESTLESS THAT IT IS HARD TO SIT STILL: 0
6. BECOMING EASILY ANNOYED OR IRRITABLE: 1
4. TROUBLE RELAXING: 0
IF YOU CHECKED OFF ANY PROBLEMS ON THIS QUESTIONNAIRE, HOW DIFFICULT HAVE THESE PROBLEMS MADE IT FOR YOU TO DO YOUR WORK, TAKE CARE OF THINGS AT HOME, OR GET ALONG WITH OTHER PEOPLE: NOT DIFFICULT AT ALL
2. NOT BEING ABLE TO STOP OR CONTROL WORRYING: 0
GAD7 TOTAL SCORE: 1
3. WORRYING TOO MUCH ABOUT DIFFERENT THINGS: 0
7. FEELING AFRAID AS IF SOMETHING AWFUL MIGHT HAPPEN: 0

## 2023-11-09 ASSESSMENT — PATIENT HEALTH QUESTIONNAIRE - PHQ9
SUM OF ALL RESPONSES TO PHQ QUESTIONS 1-9: 0
1. LITTLE INTEREST OR PLEASURE IN DOING THINGS: 0
SUM OF ALL RESPONSES TO PHQ9 QUESTIONS 1 & 2: 0
2. FEELING DOWN, DEPRESSED OR HOPELESS: 0

## 2023-11-09 ASSESSMENT — ENCOUNTER SYMPTOMS
RESPIRATORY NEGATIVE: 1
CHEST TIGHTNESS: 0
CONSTIPATION: 0
RECTAL PAIN: 0
ABDOMINAL PAIN: 0
DIARRHEA: 0
GASTROINTESTINAL NEGATIVE: 1
NAUSEA: 0
SHORTNESS OF BREATH: 0
BACK PAIN: 1

## 2023-11-09 NOTE — PROGRESS NOTES
Pt presents today for pap smear and breast exam.  She also complains of     Last mammogram:  2022  Last pap smear:  2022  Contraception:    :  2  Para:  2  AB:    Last bone density:  20 yrs ago  Last colonoscopy:   not due until 
normal and left adnexa normal.        Right: No mass, tenderness or fullness. Left: No mass, tenderness or fullness. Musculoskeletal:         General: Normal range of motion. Cervical back: Normal range of motion and neck supple. Skin:     General: Skin is warm and dry. Capillary Refill: Capillary refill takes less than 2 seconds. Neurological:      Mental Status: She is alert and oriented to person, place, and time. Psychiatric:         Behavior: Behavior normal.         Thought Content: Thought content normal.         Judgment: Judgment normal.         MEDICATIONS:  No orders of the defined types were placed in this encounter. ORDERS:  Orders Placed This Encounter   Procedures    JOSE MANUEL DIGITAL SCREEN W OR WO CAD BILATERAL    Human papillomavirus (HPV) DNA probe thin prep high risk    PAP SMEAR         11/9/2023     9:46 AM   PHQ-9    Little interest or pleasure in doing things 0   Feeling down, depressed, or hopeless 0   PHQ-2 Score 0   PHQ-9 Total Score 0          11/9/2023     9:47 AM   EVE-7 SCREENING   Feeling nervous, anxious, or on edge Not at all   Not being able to stop or control worrying Not at all   Worrying too much about different things Not at all   Trouble relaxing Not at all   Being so restless that it is hard to sit still Not at all   Becoming easily annoyed or irritable Several days   Feeling afraid as if something awful might happen Not at all   EVE-7 Total Score 1   How difficult have these problems made it for you to do your work, take care of things at home, or get along with other people? Not difficult at all        Plan:  [address] - pap collected, self breast exam educated and encouraged, clinical breast exam performed. Mammogram ordered. Shoulder/back/neck pain - patient interested in breast reduction.  She will notify office if she desires referral.  Return in 1 year for WWE and PRN

## 2023-11-14 ENCOUNTER — TELEPHONE (OUTPATIENT)
Dept: OBGYN CLINIC | Age: 58
End: 2023-11-14

## 2023-11-14 LAB
HPV HR 12 DNA SPEC QL NAA+PROBE: NOT DETECTED
HPV16 DNA SPEC QL NAA+PROBE: NOT DETECTED
HPV16+18+H RISK 12 DNA SPEC-IMP: NORMAL
HPV18 DNA SPEC QL NAA+PROBE: NOT DETECTED

## (undated) DEVICE — TUBING, SUCTION, 1/4" X 12', STRAIGHT: Brand: MEDLINE

## (undated) DEVICE — TOWEL,OR,DSP,ST,BLUE,STD,4/PK,20PK/CS: Brand: MEDLINE

## (undated) DEVICE — PACK,UNIVERSAL,NO GOWNS: Brand: MEDLINE

## (undated) DEVICE — MASK ANES AD M SZ 5 PREM TAIL VLV INFL PRT UNSCENTED HK RNG

## (undated) DEVICE — CANNULA NSL AD L7FT DIV O2 CO2 W/ M LUERLOCK TRMPT CONN

## (undated) DEVICE — CHLORAPREP 26ML ORANGE

## (undated) DEVICE — GLOVE SURG SZ 7 CRM LTX FREE POLYISOPRENE POLYMER BEAD ANTI

## (undated) DEVICE — ENDO KIT,LOURDES HOSPITAL: Brand: MEDLINE INDUSTRIES, INC.

## (undated) DEVICE — SUTURE VCRL SZ 3-0 L27IN ABSRB UD L26MM SH 1/2 CIR J416H

## (undated) DEVICE — MAJOR BSIN SETUP PK

## (undated) DEVICE — ADHESIVE SKIN CLSR 0.7ML TOP DERMBND ADV